# Patient Record
Sex: FEMALE | Race: BLACK OR AFRICAN AMERICAN | NOT HISPANIC OR LATINO | URBAN - METROPOLITAN AREA
[De-identification: names, ages, dates, MRNs, and addresses within clinical notes are randomized per-mention and may not be internally consistent; named-entity substitution may affect disease eponyms.]

---

## 2017-03-20 ENCOUNTER — INPATIENT (INPATIENT)
Facility: HOSPITAL | Age: 57
LOS: 0 days | Discharge: HOME | End: 2017-03-21
Attending: INTERNAL MEDICINE | Admitting: INTERNAL MEDICINE
Payer: COMMERCIAL

## 2017-03-20 VITALS
OXYGEN SATURATION: 100 % | DIASTOLIC BLOOD PRESSURE: 93 MMHG | TEMPERATURE: 98 F | HEART RATE: 94 BPM | SYSTOLIC BLOOD PRESSURE: 146 MMHG | RESPIRATION RATE: 18 BRPM

## 2017-03-20 DIAGNOSIS — J45.909 UNSPECIFIED ASTHMA, UNCOMPLICATED: ICD-10-CM

## 2017-03-20 DIAGNOSIS — Z98.890 OTHER SPECIFIED POSTPROCEDURAL STATES: Chronic | ICD-10-CM

## 2017-03-20 DIAGNOSIS — R55 SYNCOPE AND COLLAPSE: ICD-10-CM

## 2017-03-20 DIAGNOSIS — N64.52 NIPPLE DISCHARGE: ICD-10-CM

## 2017-03-20 DIAGNOSIS — I10 ESSENTIAL (PRIMARY) HYPERTENSION: ICD-10-CM

## 2017-03-20 DIAGNOSIS — Z41.8 ENCOUNTER FOR OTHER PROCEDURES FOR PURPOSES OTHER THAN REMEDYING HEALTH STATE: ICD-10-CM

## 2017-03-20 LAB
ALBUMIN SERPL ELPH-MCNC: 4.2 G/DL — SIGNIFICANT CHANGE UP (ref 3.3–5)
ALP SERPL-CCNC: 60 U/L — SIGNIFICANT CHANGE UP (ref 40–120)
ALT FLD-CCNC: 16 U/L — SIGNIFICANT CHANGE UP (ref 4–33)
APPEARANCE UR: CLEAR — SIGNIFICANT CHANGE UP
APTT BLD: 34.5 SEC — SIGNIFICANT CHANGE UP (ref 27.5–37.4)
AST SERPL-CCNC: 20 U/L — SIGNIFICANT CHANGE UP (ref 4–32)
BACTERIA # UR AUTO: SIGNIFICANT CHANGE UP
BASE EXCESS BLDV CALC-SCNC: 7.2 MMOL/L — SIGNIFICANT CHANGE UP
BASOPHILS # BLD AUTO: 0.02 K/UL — SIGNIFICANT CHANGE UP (ref 0–0.2)
BASOPHILS NFR BLD AUTO: 0.3 % — SIGNIFICANT CHANGE UP (ref 0–2)
BILIRUB SERPL-MCNC: 0.3 MG/DL — SIGNIFICANT CHANGE UP (ref 0.2–1.2)
BILIRUB UR-MCNC: NEGATIVE — SIGNIFICANT CHANGE UP
BLOOD GAS VENOUS - CREATININE: 0.68 MG/DL — SIGNIFICANT CHANGE UP (ref 0.5–1.3)
BLOOD UR QL VISUAL: NEGATIVE — SIGNIFICANT CHANGE UP
BUN SERPL-MCNC: 10 MG/DL — SIGNIFICANT CHANGE UP (ref 7–23)
CALCIUM SERPL-MCNC: 9.6 MG/DL — SIGNIFICANT CHANGE UP (ref 8.4–10.5)
CHLORIDE BLDV-SCNC: 107 MMOL/L — SIGNIFICANT CHANGE UP (ref 96–108)
CHLORIDE SERPL-SCNC: 101 MMOL/L — SIGNIFICANT CHANGE UP (ref 98–107)
CK MB BLD-MCNC: 0.5 — SIGNIFICANT CHANGE UP (ref 0–2.5)
CK MB BLD-MCNC: 1.23 NG/ML — SIGNIFICANT CHANGE UP (ref 1–4.7)
CK MB BLD-MCNC: 1.33 NG/ML — SIGNIFICANT CHANGE UP (ref 1–4.7)
CK SERPL-CCNC: 166 U/L — SIGNIFICANT CHANGE UP (ref 25–170)
CK SERPL-CCNC: 272 U/L — HIGH (ref 25–170)
CO2 SERPL-SCNC: 27 MMOL/L — SIGNIFICANT CHANGE UP (ref 22–31)
COLOR SPEC: YELLOW — SIGNIFICANT CHANGE UP
CREAT SERPL-MCNC: 0.69 MG/DL — SIGNIFICANT CHANGE UP (ref 0.5–1.3)
D DIMER BLD IA.RAPID-MCNC: < 150 NG/ML — SIGNIFICANT CHANGE UP
EOSINOPHIL # BLD AUTO: 0.04 K/UL — SIGNIFICANT CHANGE UP (ref 0–0.5)
EOSINOPHIL NFR BLD AUTO: 0.6 % — SIGNIFICANT CHANGE UP (ref 0–6)
GAS PNL BLDV: 135 MMOL/L — LOW (ref 136–146)
GLUCOSE BLDV-MCNC: 91 — SIGNIFICANT CHANGE UP (ref 70–99)
GLUCOSE SERPL-MCNC: 88 MG/DL — SIGNIFICANT CHANGE UP (ref 70–99)
GLUCOSE UR-MCNC: NEGATIVE — SIGNIFICANT CHANGE UP
HCO3 BLDV-SCNC: 28 MMOL/L — HIGH (ref 20–27)
HCT VFR BLD CALC: 42.9 % — SIGNIFICANT CHANGE UP (ref 34.5–45)
HCT VFR BLDV CALC: 43.6 % — SIGNIFICANT CHANGE UP (ref 34.5–45)
HGB BLD-MCNC: 14 G/DL — SIGNIFICANT CHANGE UP (ref 11.5–15.5)
HGB BLDV-MCNC: 14.2 G/DL — SIGNIFICANT CHANGE UP (ref 11.5–15.5)
IMM GRANULOCYTES NFR BLD AUTO: 0 % — SIGNIFICANT CHANGE UP (ref 0–1.5)
INR BLD: 0.97 — SIGNIFICANT CHANGE UP (ref 0.88–1.17)
KETONES UR-MCNC: NEGATIVE — SIGNIFICANT CHANGE UP
LACTATE BLDV-MCNC: 1.2 MMOL/L — SIGNIFICANT CHANGE UP (ref 0.5–2)
LEUKOCYTE ESTERASE UR-ACNC: NEGATIVE — SIGNIFICANT CHANGE UP
LYMPHOCYTES # BLD AUTO: 2.88 K/UL — SIGNIFICANT CHANGE UP (ref 1–3.3)
LYMPHOCYTES # BLD AUTO: 44.9 % — HIGH (ref 13–44)
MCHC RBC-ENTMCNC: 27.7 PG — SIGNIFICANT CHANGE UP (ref 27–34)
MCHC RBC-ENTMCNC: 32.6 % — SIGNIFICANT CHANGE UP (ref 32–36)
MCV RBC AUTO: 84.8 FL — SIGNIFICANT CHANGE UP (ref 80–100)
MONOCYTES # BLD AUTO: 0.53 K/UL — SIGNIFICANT CHANGE UP (ref 0–0.9)
MONOCYTES NFR BLD AUTO: 8.3 % — SIGNIFICANT CHANGE UP (ref 2–14)
MUCOUS THREADS # UR AUTO: SIGNIFICANT CHANGE UP
NEUTROPHILS # BLD AUTO: 2.94 K/UL — SIGNIFICANT CHANGE UP (ref 1.8–7.4)
NEUTROPHILS NFR BLD AUTO: 45.9 % — SIGNIFICANT CHANGE UP (ref 43–77)
NITRITE UR-MCNC: NEGATIVE — SIGNIFICANT CHANGE UP
NON-SQ EPI CELLS # UR AUTO: <1 — SIGNIFICANT CHANGE UP
PCO2 BLDV: 58 MMHG — HIGH (ref 41–51)
PH BLDV: 7.36 PH — SIGNIFICANT CHANGE UP (ref 7.32–7.43)
PH UR: 7 — SIGNIFICANT CHANGE UP (ref 4.6–8)
PLATELET # BLD AUTO: 247 K/UL — SIGNIFICANT CHANGE UP (ref 150–400)
PMV BLD: 10.8 FL — SIGNIFICANT CHANGE UP (ref 7–13)
PO2 BLDV: 26 MMHG — LOW (ref 35–40)
POTASSIUM BLDV-SCNC: 3.6 MMOL/L — SIGNIFICANT CHANGE UP (ref 3.4–4.5)
POTASSIUM SERPL-MCNC: 4.1 MMOL/L — SIGNIFICANT CHANGE UP (ref 3.5–5.3)
POTASSIUM SERPL-SCNC: 4.1 MMOL/L — SIGNIFICANT CHANGE UP (ref 3.5–5.3)
PROT SERPL-MCNC: 7.8 G/DL — SIGNIFICANT CHANGE UP (ref 6–8.3)
PROT UR-MCNC: NEGATIVE — SIGNIFICANT CHANGE UP
PROTHROM AB SERPL-ACNC: 10.9 SEC — SIGNIFICANT CHANGE UP (ref 9.8–13.1)
RBC # BLD: 5.06 M/UL — SIGNIFICANT CHANGE UP (ref 3.8–5.2)
RBC # FLD: 15.1 % — HIGH (ref 10.3–14.5)
RBC CASTS # UR COMP ASSIST: SIGNIFICANT CHANGE UP (ref 0–?)
SAO2 % BLDV: 39 % — LOW (ref 60–85)
SODIUM SERPL-SCNC: 142 MMOL/L — SIGNIFICANT CHANGE UP (ref 135–145)
SP GR SPEC: 1.02 — SIGNIFICANT CHANGE UP (ref 1–1.03)
SQUAMOUS # UR AUTO: SIGNIFICANT CHANGE UP
TROPONIN T SERPL-MCNC: < 0.06 NG/ML — SIGNIFICANT CHANGE UP (ref 0–0.06)
TROPONIN T SERPL-MCNC: < 0.06 NG/ML — SIGNIFICANT CHANGE UP (ref 0–0.06)
TSH SERPL-MCNC: 0.86 UIU/ML — SIGNIFICANT CHANGE UP (ref 0.27–4.2)
UROBILINOGEN FLD QL: NORMAL E.U. — SIGNIFICANT CHANGE UP (ref 0.1–0.2)
WBC # BLD: 6.41 K/UL — SIGNIFICANT CHANGE UP (ref 3.8–10.5)
WBC # FLD AUTO: 6.41 K/UL — SIGNIFICANT CHANGE UP (ref 3.8–10.5)
WBC UR QL: SIGNIFICANT CHANGE UP (ref 0–?)

## 2017-03-20 PROCEDURE — 71020: CPT | Mod: 26

## 2017-03-20 PROCEDURE — 70450 CT HEAD/BRAIN W/O DYE: CPT | Mod: 26

## 2017-03-20 RX ORDER — ASPIRIN/CALCIUM CARB/MAGNESIUM 324 MG
81 TABLET ORAL DAILY
Qty: 0 | Refills: 0 | Status: DISCONTINUED | OUTPATIENT
Start: 2017-03-20 | End: 2017-03-21

## 2017-03-20 RX ORDER — SODIUM CHLORIDE 9 MG/ML
1000 INJECTION INTRAMUSCULAR; INTRAVENOUS; SUBCUTANEOUS ONCE
Qty: 0 | Refills: 0 | Status: COMPLETED | OUTPATIENT
Start: 2017-03-20 | End: 2017-03-20

## 2017-03-20 RX ORDER — LOSARTAN POTASSIUM 100 MG/1
100 TABLET, FILM COATED ORAL DAILY
Qty: 0 | Refills: 0 | Status: DISCONTINUED | OUTPATIENT
Start: 2017-03-20 | End: 2017-03-21

## 2017-03-20 RX ORDER — ALBUTEROL 90 UG/1
2 AEROSOL, METERED ORAL EVERY 6 HOURS
Qty: 0 | Refills: 0 | Status: DISCONTINUED | OUTPATIENT
Start: 2017-03-20 | End: 2017-03-21

## 2017-03-20 RX ORDER — ENOXAPARIN SODIUM 100 MG/ML
40 INJECTION SUBCUTANEOUS EVERY 24 HOURS
Qty: 0 | Refills: 0 | Status: DISCONTINUED | OUTPATIENT
Start: 2017-03-20 | End: 2017-03-21

## 2017-03-20 RX ADMIN — Medication 81 MILLIGRAM(S): at 19:51

## 2017-03-20 RX ADMIN — SODIUM CHLORIDE 2000 MILLILITER(S): 9 INJECTION INTRAMUSCULAR; INTRAVENOUS; SUBCUTANEOUS at 17:44

## 2017-03-20 NOTE — H&P ADULT. - GASTROINTESTINAL DETAILS
no rebound tenderness/bowel sounds normal/normal/nontender/no bruit/no distention/no organomegaly/no guarding/no rigidity/no masses palpable/soft

## 2017-03-20 NOTE — H&P ADULT. - ASSESSMENT
56 y/o F PMHx HTN presents with cough x 1 year, dizziness and syncope x 1 wk worsening since yesterday, right sided chest pain radiating to arm, headache, and bilateral nipple discharge. 58 yo female with PMHx of HTN and Asthma p/w dizziness, lightheadeadness after coughing, followed by passing out x 2 episodes today, admitted to tele for syncope, r/o ACS and Hypovolemia\Dehydration.    +Syncope-->Abdullahi x2, orthostatics, TTE, UA  +Hypovolemia/Dehydration-->IV Fluids

## 2017-03-20 NOTE — H&P ADULT. - NEUROLOGICAL DETAILS
alert and oriented x 3/normal strength/sensation intact/no spontaneous movement/responds to pain/responds to verbal commands

## 2017-03-20 NOTE — H&P ADULT. - HISTORY OF PRESENT ILLNESS
56 y/o F PMHx HTN and asthma presents with a cough x 1 year. Pt has never sought treatment for this cough because she thought it was her asthma or the weather. She had previous been prescribed an inhaler, but does not use it because it causes nausea. The cough is sometimes productive, and when it is she brings up white sputum. Sometimes she coughs after eating food, but not all of the time. She also reports that she needs to drink water and eat slowly because she feel as if she may choke. Pt notes bifrontal, pulsatile headache and dizziness after coughing since last week intermittently, but has increased in frequency since yesterday. Over the past week she has "blacked out" 3-4 times after these coughing and dizzy spells, and this morning it occurred twice while she was at work. Nothing alleviates this, although when it occurs she makes sure to sit down and rest before she falls. Pt reports feeling unbalanced while walking aside from the dizziness, and right sided chest pain radiating to the right upper extremity. Pt also reports that yesterday she noticed discharge coming from both of her nipples while doing a self-breast exam, last mammogram was last week. Pt denies falling, fever, chills, nausea, vomiting, vision changes, sore throat, jaw pain, abdominal pain, urinary and bowel changes. 58 y/o F PMHx HTN and asthma presents with passing out episodes today. Pt reports to chronic intermittent cough x 1 year, non-productive in nature, accompanied with dizziness and lightheadedness for past 2 weeks to extent where today she felt like passing out. Pt sat down on her chair and passed out for about 5-10 secs unwitnessed. Pt had 2 of these episodes today and decided to come to the hospital Pt has never sought treatment for this cough because she thought it was her asthma or the weather. She had previous been prescribed an inhaler, but does not use it because it causes nausea. The cough is sometimes productive, and when it is she brings up white sputum. Sometimes she coughs after eating food, but not all of the time. She also reports that she needs to drink water and eat slowly because she feel as if she may choke. Pt notes bifrontal, pulsatile headache and dizziness after coughing since last week intermittently, but has increased in frequency since yesterday. Over the past week she has "blacked out" 3-4 times after these coughing and dizzy spells, and this morning it occurred twice while she was at work. Nothing alleviates this, although when it occurs she makes sure to sit down and rest before she falls. Pt reports feeling unbalanced while walking aside from the dizziness, and right sided chest pain radiating to the right upper extremity. Pt also reports that yesterday she noticed discharge coming from both of her nipples while doing a self-breast exam, last mammogram was last week. Pt denies falling, fever, chills, nausea, vomiting, vision changes, sore throat, jaw pain, abdominal pain, urinary and bowel changes. 56 y/o F PMHx HTN and asthma presents with passing out episodes today. Pt reports to chronic intermittent cough x 1 year, non-productive in nature, accompanied with dizziness and lightheadedness for past 2 weeks to extent where today she felt like passing out. Pt sat down on her chair and passed out for about 5-10 secs unwitnessed. Pt had 2 of these episodes today and decided to come to the hospital Pt recently established PCP who she visited 2 weeks ago. She never sought treatment for her cough because she thought it was her mild asthma. She had previous been prescribed an inhaler, but does not use it because it causes nausea. Pt admits to bifrontal, pulsatile headache and dizziness after coughing since last week intermittently, but has increased in frequency since yesterday. Nothing alleviates this, although when it occurs she makes sure to sit down and rest before she falls. Pt reports feeling unbalanced while walking aside from the dizziness, and right sided chest pain radiating to the right upper extremity. Chest pain described as right sided discomfort radiating to right arm. Pt also reports that yesterday she noticed discharge coming from both of her nipples while doing a self-breast exam, last mammogram was last week with no results yet. Pt denies falling, fever, chills, nausea, vomiting, vision changes, sore throat, jaw pain, abdominal pain, urinary and bowel changes.

## 2017-03-20 NOTE — ED PROVIDER NOTE - OBJECTIVE STATEMENT
58yo female with pmh of HTN presents with cough and syncope. Pt states for the past week she's been coughing a lot and today she "passed out" 2 times after coughing and while in the waiting room today also syncopized. Pt states that she feels like she is going to pass out and is able to sit down, passes out for about 20s and then takes her like a minute to feel back to normal. Pt denies falls, head trauma. Pt also reports right sided arm, chest and neck pain going up to her head. Pt also with sob at times. Pt reports right nipple discharge yesterday, states she had a mammogram done last week which she was called for yesterday stating she needed a follow up on in April. Pt denies fevers/chills, abd pain/n/v/d, urinary symptoms, palp, recent travel and sickness.

## 2017-03-20 NOTE — ED PROVIDER NOTE - MEDICAL DECISION MAKING DETAILS
58yo female with pmh of HTN presents with cough and syncope r/o arrhythmia and pna - labs, ekg, cxr, syncope work up

## 2017-03-20 NOTE — H&P ADULT. - PSYCHIATRIC
negative not examined Affect and characteristics of appearance, verbalizations, behaviors are appropriate

## 2017-03-20 NOTE — H&P ADULT. - PROBLEM SELECTOR PLAN 1
tele monitor   cardiac enzymes x 2 to r/o ACS  Orthostatic blood pressures  DASH diet  Ecotrin 81mg po daily  FLP, TSH, UA  TTE  High suspicion for hypovolemia  IV Fluid hydration  Hold HCTZ for now

## 2017-03-20 NOTE — H&P ADULT. - FAMILY HISTORY
Father  Still living? Unknown  Family history of stomach cancer, Age at diagnosis: Age Unknown  Family history of esophageal cancer, Age at diagnosis: Age Unknown     Child  Still living? Unknown  Family history of retinoblastoma, Age at diagnosis: Age Unknown     Grandparent  Still living? Unknown  Family history of ovarian cancer, Age at diagnosis: Age Unknown

## 2017-03-20 NOTE — H&P ADULT. - BREASTS COMMENTS
Minimal discharge, left nipple discharge is brown, right nipple discharge is white. Minimal discharge, left nipple discharge is white, right nipple discharge is brown. Minimal discharge, left nipple discharge is white, right nipple discharge is brown. Small nodular-like bump felt at 6 o'clock proximal to areola. No erythema

## 2017-03-20 NOTE — ED ADULT NURSE NOTE - OBJECTIVE STATEMENT
c/o dizziness, presyncope, right breast drainage. iv start 20g right ac. blood drawn. pt alert and oriented x 3.

## 2017-03-20 NOTE — ED PROVIDER NOTE - ATTENDING CONTRIBUTION TO CARE
DR Bloch- Patient c/o episodes of near/syncope/syncope, sometimes @ cough, recent URI, right sidedCP, breast soreness with some d/c, No fever, chills. HEENT nml, Lungs ocasional wheeze, throat nml, heart sounds s1s2, abd soft nontender, ext no edema-

## 2017-03-20 NOTE — H&P ADULT. - PROBLEM SELECTOR PLAN 4
pt s/p mammogram done 2 weeks ago. Pt scheduled to follow with her PCP to discuss mammogram results this week

## 2017-03-20 NOTE — H&P ADULT. - NEGATIVE GASTROINTESTINAL SYMPTOMS
no hematochezia/no nausea/no change in bowel habits/no abdominal pain/no diarrhea/no constipation/no melena/no vomiting

## 2017-03-20 NOTE — ED ADULT TRIAGE NOTE - CHIEF COMPLAINT QUOTE
Pt states she's been coughing for "a while" and for the past week has sharp pain in her right side of neck and head when she coughs, accompanied by dizziness and lightheadedness, reports syncope this morning. Pt also c/o pain to right chest, right breast, and noticed secretions from her right nipple this morning. PMH of HTN. Pt started Olmesartan Hctz 1 week ago

## 2017-03-21 VITALS
DIASTOLIC BLOOD PRESSURE: 70 MMHG | RESPIRATION RATE: 20 BRPM | HEART RATE: 91 BPM | SYSTOLIC BLOOD PRESSURE: 122 MMHG | OXYGEN SATURATION: 100 % | TEMPERATURE: 98 F

## 2017-03-21 LAB
BUN SERPL-MCNC: 8 MG/DL — SIGNIFICANT CHANGE UP (ref 7–23)
CALCIUM SERPL-MCNC: 9.2 MG/DL — SIGNIFICANT CHANGE UP (ref 8.4–10.5)
CHLORIDE SERPL-SCNC: 102 MMOL/L — SIGNIFICANT CHANGE UP (ref 98–107)
CO2 SERPL-SCNC: 25 MMOL/L — SIGNIFICANT CHANGE UP (ref 22–31)
CREAT SERPL-MCNC: 0.62 MG/DL — SIGNIFICANT CHANGE UP (ref 0.5–1.3)
GLUCOSE SERPL-MCNC: 107 MG/DL — HIGH (ref 70–99)
HCT VFR BLD CALC: 41.7 % — SIGNIFICANT CHANGE UP (ref 34.5–45)
HGB BLD-MCNC: 13.8 G/DL — SIGNIFICANT CHANGE UP (ref 11.5–15.5)
MCHC RBC-ENTMCNC: 28 PG — SIGNIFICANT CHANGE UP (ref 27–34)
MCHC RBC-ENTMCNC: 33.1 % — SIGNIFICANT CHANGE UP (ref 32–36)
MCV RBC AUTO: 84.6 FL — SIGNIFICANT CHANGE UP (ref 80–100)
PLATELET # BLD AUTO: 244 K/UL — SIGNIFICANT CHANGE UP (ref 150–400)
PMV BLD: 10.5 FL — SIGNIFICANT CHANGE UP (ref 7–13)
POTASSIUM SERPL-MCNC: 3.8 MMOL/L — SIGNIFICANT CHANGE UP (ref 3.5–5.3)
POTASSIUM SERPL-SCNC: 3.8 MMOL/L — SIGNIFICANT CHANGE UP (ref 3.5–5.3)
RBC # BLD: 4.93 M/UL — SIGNIFICANT CHANGE UP (ref 3.8–5.2)
RBC # FLD: 15 % — HIGH (ref 10.3–14.5)
SODIUM SERPL-SCNC: 142 MMOL/L — SIGNIFICANT CHANGE UP (ref 135–145)
WBC # BLD: 6.28 K/UL — SIGNIFICANT CHANGE UP (ref 3.8–10.5)
WBC # FLD AUTO: 6.28 K/UL — SIGNIFICANT CHANGE UP (ref 3.8–10.5)

## 2017-03-21 PROCEDURE — 93306 TTE W/DOPPLER COMPLETE: CPT | Mod: 26

## 2017-03-21 RX ORDER — ONDANSETRON 8 MG/1
4 TABLET, FILM COATED ORAL ONCE
Qty: 0 | Refills: 0 | Status: COMPLETED | OUTPATIENT
Start: 2017-03-21 | End: 2017-03-21

## 2017-03-21 RX ORDER — MECLIZINE HCL 12.5 MG
1 TABLET ORAL
Qty: 28 | Refills: 0 | OUTPATIENT
Start: 2017-03-21 | End: 2017-04-04

## 2017-03-21 RX ADMIN — ONDANSETRON 4 MILLIGRAM(S): 8 TABLET, FILM COATED ORAL at 02:27

## 2017-03-21 RX ADMIN — Medication 81 MILLIGRAM(S): at 13:00

## 2017-03-21 RX ADMIN — LOSARTAN POTASSIUM 100 MILLIGRAM(S): 100 TABLET, FILM COATED ORAL at 07:36

## 2017-03-21 NOTE — DISCHARGE NOTE ADULT - HOSPITAL COURSE
56 y/o female with a PMHx of HTN and asthma presents to ED with dizziness and lightheadeadness after coughing, followed by two syncopal episodes. MI ruled out. Echo shows normal Lv function.  Syncope due to dehydration. Patient stable for discharge home as per Dr Dupont.       + Hypovolemia/dehydration- S/P 1L NS, improved        EKG: NSR at 89 bpm  CE x2: Negative  CXR: Clear lungs. No pleural effusions or pneumothorax. Cardiac and mediastinal silhouettes within normal limits. Trachea midline. Unremarkable osseous structures.  CT head: No CT evidence of acute territorial infarct, intracranial hemorrhage or mass effect. No displaced calvarial fracture.  Orthostatics: Negative   UA: Negative       PMH  (Admit Diagnosis) Syncope and collapse  (PMH) Asthma  (PMH) HTN (hypertension)  (Principal DC/DX) Syncope  (Problem/DX) Need for prophylactic measure  (Problem/DX) Nipple discharge in female  (Problem/DX) Asthma  (Problem/DX) HTN (hypertension)  (Problem/DX) Syncope  (PSH) H/O myomectomy

## 2017-03-21 NOTE — DISCHARGE NOTE ADULT - MEDICATION SUMMARY - MEDICATIONS TO TAKE
I will START or STAY ON the medications listed below when I get home from the hospital:    olmesartan-hydroCHLOROthiazide 40mg-25mg oral tablet  -- 1 tab(s) by mouth once a day  -- Indication: For HTN (hypertension)    ProAir HFA 90 mcg/inh inhalation aerosol  -- 2 puff(s) inhaled 4 times a day, As Needed  -- Indication: For Asthma I will START or STAY ON the medications listed below when I get home from the hospital:    Antivert 12.5 mg oral tablet  -- 1 tab(s) by mouth 2 times a day, As Needed -for dizziness  -- May cause drowsiness.  Alcohol may intensify this effect.  Use care when operating dangerous machinery.    -- Indication: For vertigo    olmesartan-hydroCHLOROthiazide 40mg-25mg oral tablet  -- 1 tab(s) by mouth once a day  -- Indication: For HTN (hypertension)    ProAir HFA 90 mcg/inh inhalation aerosol  -- 2 puff(s) inhaled 4 times a day, As Needed  -- Indication: For Asthma

## 2017-03-21 NOTE — DISCHARGE NOTE ADULT - CARE PROVIDER_API CALL
Darline Dupont (DO), Cardiology; Internal Medicine  [address]  [place] [address]  Phone: [phone]  Fax: [phone]

## 2017-03-21 NOTE — DISCHARGE NOTE ADULT - CARE PLAN
Principal Discharge DX:	Syncope  Goal:	prevent recurring syncope  Instructions for follow-up, activity and diet:	Follow up with Dr dorsey in one week. may need stress testing and holter  low salt, low fat, low cholesterol  Secondary Diagnosis:	HTN (hypertension)  Instructions for follow-up, activity and diet:	olmesartan-HCTZ  low salt, low fat, low cholesterol  Secondary Diagnosis:	Asthma  Instructions for follow-up, activity and diet:	proair Principal Discharge DX:	Syncope  Goal:	prevent recurring syncope  Instructions for follow-up, activity and diet:	Follow up with Dr dorsey in one week. May need stress testing and holter  low salt, low fat, low cholesterol  Follow up with your primary care doctor- may need referral to a neurologist as outpatient  Secondary Diagnosis:	HTN (hypertension)  Instructions for follow-up, activity and diet:	olmesartan-HCTZ  low salt, low fat, low cholesterol  Secondary Diagnosis:	Asthma  Instructions for follow-up, activity and diet:	proair

## 2017-03-21 NOTE — DISCHARGE NOTE ADULT - PLAN OF CARE
Follow up with Dr dorsey in one week. may need stress testing and holter  low salt, low fat, low cholesterol prevent recurring syncope olmesartan-HCTZ  low salt, low fat, low cholesterol proair Follow up with Dr dorsey in one week. May need stress testing and holter  low salt, low fat, low cholesterol  Follow up with your primary care doctor- may need referral to a neurologist as outpatient

## 2017-03-21 NOTE — DISCHARGE NOTE ADULT - PATIENT PORTAL LINK FT
“You can access the Follow[employer] Patient Portal, offered by [facility], by registering with the following website: [email or web address]”

## 2017-05-18 ENCOUNTER — OUTPATIENT (OUTPATIENT)
Dept: OUTPATIENT SERVICES | Facility: HOSPITAL | Age: 57
LOS: 1 days | Discharge: HOME | End: 2017-05-18
Payer: COMMERCIAL

## 2017-05-18 DIAGNOSIS — Z98.890 OTHER SPECIFIED POSTPROCEDURAL STATES: Chronic | ICD-10-CM

## 2017-05-18 DIAGNOSIS — R94.39 ABNORMAL RESULT OF OTHER CARDIOVASCULAR FUNCTION STUDY: ICD-10-CM

## 2017-05-18 LAB
BUN SERPL-MCNC: 7 MG/DL — SIGNIFICANT CHANGE UP (ref 7–23)
CALCIUM SERPL-MCNC: 9.5 MG/DL — SIGNIFICANT CHANGE UP (ref 8.4–10.5)
CHLORIDE SERPL-SCNC: 100 MMOL/L — SIGNIFICANT CHANGE UP (ref 98–107)
CO2 SERPL-SCNC: 26 MMOL/L — SIGNIFICANT CHANGE UP (ref 22–31)
CREAT SERPL-MCNC: 0.72 MG/DL — SIGNIFICANT CHANGE UP (ref 0.5–1.3)
GLUCOSE SERPL-MCNC: 96 MG/DL — SIGNIFICANT CHANGE UP (ref 70–99)
HBA1C BLD-MCNC: 6.6 % — HIGH (ref 4–5.6)
HCT VFR BLD CALC: 41 % — SIGNIFICANT CHANGE UP (ref 34.5–45)
HGB BLD-MCNC: 13.3 G/DL — SIGNIFICANT CHANGE UP (ref 11.5–15.5)
MCHC RBC-ENTMCNC: 27.5 PG — SIGNIFICANT CHANGE UP (ref 27–34)
MCHC RBC-ENTMCNC: 32.4 % — SIGNIFICANT CHANGE UP (ref 32–36)
MCV RBC AUTO: 84.9 FL — SIGNIFICANT CHANGE UP (ref 80–100)
PLATELET # BLD AUTO: 292 K/UL — SIGNIFICANT CHANGE UP (ref 150–400)
PMV BLD: 10.1 FL — SIGNIFICANT CHANGE UP (ref 7–13)
POTASSIUM SERPL-MCNC: 3.6 MMOL/L — SIGNIFICANT CHANGE UP (ref 3.5–5.3)
POTASSIUM SERPL-SCNC: 3.6 MMOL/L — SIGNIFICANT CHANGE UP (ref 3.5–5.3)
RBC # BLD: 4.83 M/UL — SIGNIFICANT CHANGE UP (ref 3.8–5.2)
RBC # FLD: 14.8 % — HIGH (ref 10.3–14.5)
SODIUM SERPL-SCNC: 140 MMOL/L — SIGNIFICANT CHANGE UP (ref 135–145)
WBC # BLD: 6.52 K/UL — SIGNIFICANT CHANGE UP (ref 3.8–10.5)
WBC # FLD AUTO: 6.52 K/UL — SIGNIFICANT CHANGE UP (ref 3.8–10.5)

## 2017-05-18 PROCEDURE — 93010 ELECTROCARDIOGRAM REPORT: CPT

## 2017-05-18 PROCEDURE — 93458 L HRT ARTERY/VENTRICLE ANGIO: CPT | Mod: 26

## 2017-05-18 RX ORDER — SODIUM CHLORIDE 9 MG/ML
3 INJECTION INTRAMUSCULAR; INTRAVENOUS; SUBCUTANEOUS EVERY 8 HOURS
Qty: 0 | Refills: 0 | Status: DISCONTINUED | OUTPATIENT
Start: 2017-05-18 | End: 2017-06-02

## 2017-05-18 RX ORDER — SODIUM CHLORIDE 9 MG/ML
500 INJECTION INTRAMUSCULAR; INTRAVENOUS; SUBCUTANEOUS
Qty: 0 | Refills: 0 | Status: DISCONTINUED | OUTPATIENT
Start: 2017-05-18 | End: 2017-06-02

## 2017-05-18 NOTE — H&P CARDIOLOGY - NEGATIVE NEUROLOGICAL SYMPTOMS
no tremors/no focal seizures/no loss of sensation/no transient paralysis/no weakness/no confusion/no paresthesias/no difficulty walking/no hemiparesis/no generalized seizures/no headache/no facial palsy

## 2017-05-18 NOTE — H&P CARDIOLOGY - HISTORY OF PRESENT ILLNESS
58 y/o F PMHx HTN and asthma presents for cardiac catheretization. 58 y/o F PMHx HTN, DM, HLD and asthma presents for cardiac catheretization. Pt had a recent positive stress test which showed inferior wall ischemia. 56 y/o F PMHx HTN, DM, HLD and asthma presents for cardiac catheretization. Pt had a recent positive stress test which showed inferior wall ischemia as per Dr. Dupont. Pt states that she has been experiencing left sided chest discomfort at rest and worsening dyspnea on exertion. Pt also admits to dizziness and lightheadedness with recent admission for syncope with a normal CT head and echocardiogram. Pt denies N/V/D, fevers, chills, cough, dyspnea on exertion, orthopnea, nocturnal paroxysmal dyspnea, edema, cyanosis, heart murmurs, varicosities, phlebitis, claudication.

## 2017-05-18 NOTE — H&P CARDIOLOGY - RS GEN PE MLT RESP DETAILS PC
good air movement/airway patent/breath sounds equal/no chest wall tenderness/clear to auscultation bilaterally/respirations non-labored

## 2017-05-18 NOTE — H&P CARDIOLOGY - PMH
Asthma    HTN (hypertension) Asthma    DM (diabetes mellitus)    HLD (hyperlipidemia)    HTN (hypertension)

## 2017-07-20 ENCOUNTER — APPOINTMENT (OUTPATIENT)
Dept: SURGICAL ONCOLOGY | Facility: CLINIC | Age: 57
End: 2017-07-20
Payer: COMMERCIAL

## 2017-07-20 VITALS
HEIGHT: 69 IN | SYSTOLIC BLOOD PRESSURE: 132 MMHG | OXYGEN SATURATION: 97 % | BODY MASS INDEX: 34.21 KG/M2 | DIASTOLIC BLOOD PRESSURE: 84 MMHG | WEIGHT: 231 LBS | HEART RATE: 89 BPM

## 2017-07-20 DIAGNOSIS — Z86.39 PERSONAL HISTORY OF OTHER ENDOCRINE, NUTRITIONAL AND METABOLIC DISEASE: ICD-10-CM

## 2017-07-20 DIAGNOSIS — Z86.79 PERSONAL HISTORY OF OTHER DISEASES OF THE CIRCULATORY SYSTEM: ICD-10-CM

## 2017-07-20 PROCEDURE — 99204 OFFICE O/P NEW MOD 45 MIN: CPT

## 2017-07-20 RX ORDER — ALBUTEROL SULFATE 90 UG/1
AEROSOL, METERED RESPIRATORY (INHALATION) EVERY 6 HOURS
Refills: 0 | Status: ACTIVE | COMMUNITY

## 2017-07-20 RX ORDER — OLMESARTAN MEDOXOMIL-HYDROCHLOROTHIAZIDE 25; 40 MG/1; MG/1
40-25 TABLET, FILM COATED ORAL DAILY
Refills: 0 | Status: ACTIVE | COMMUNITY

## 2017-07-24 ENCOUNTER — OUTPATIENT (OUTPATIENT)
Dept: OUTPATIENT SERVICES | Facility: HOSPITAL | Age: 57
LOS: 1 days | End: 2017-07-24

## 2017-07-24 DIAGNOSIS — Z98.890 OTHER SPECIFIED POSTPROCEDURAL STATES: Chronic | ICD-10-CM

## 2017-07-24 DIAGNOSIS — Z00.8 ENCOUNTER FOR OTHER GENERAL EXAMINATION: ICD-10-CM

## 2017-07-26 ENCOUNTER — APPOINTMENT (OUTPATIENT)
Dept: PLASTIC SURGERY | Facility: CLINIC | Age: 57
End: 2017-07-26

## 2017-07-26 ENCOUNTER — OUTPATIENT (OUTPATIENT)
Dept: OUTPATIENT SERVICES | Facility: HOSPITAL | Age: 57
LOS: 1 days | End: 2017-07-26
Payer: SELF-PAY

## 2017-07-26 DIAGNOSIS — Z98.890 OTHER SPECIFIED POSTPROCEDURAL STATES: Chronic | ICD-10-CM

## 2017-07-26 DIAGNOSIS — D05.11 INTRADUCTAL CARCINOMA IN SITU OF RIGHT BREAST: ICD-10-CM

## 2017-07-27 ENCOUNTER — RESULT REVIEW (OUTPATIENT)
Age: 57
End: 2017-07-27

## 2017-07-27 LAB — SURGICAL PATHOLOGY STUDY: SIGNIFICANT CHANGE UP

## 2017-07-27 PROCEDURE — 88321 CONSLTJ&REPRT SLD PREP ELSWR: CPT

## 2017-08-03 ENCOUNTER — OUTPATIENT (OUTPATIENT)
Dept: OUTPATIENT SERVICES | Facility: HOSPITAL | Age: 57
LOS: 1 days | End: 2017-08-03

## 2017-08-03 VITALS
HEIGHT: 68 IN | HEART RATE: 84 BPM | TEMPERATURE: 98 F | OXYGEN SATURATION: 100 % | SYSTOLIC BLOOD PRESSURE: 130 MMHG | WEIGHT: 238.1 LBS | DIASTOLIC BLOOD PRESSURE: 80 MMHG | RESPIRATION RATE: 16 BRPM

## 2017-08-03 DIAGNOSIS — J45.909 UNSPECIFIED ASTHMA, UNCOMPLICATED: ICD-10-CM

## 2017-08-03 DIAGNOSIS — R06.02 SHORTNESS OF BREATH: ICD-10-CM

## 2017-08-03 DIAGNOSIS — D05.11 INTRADUCTAL CARCINOMA IN SITU OF RIGHT BREAST: ICD-10-CM

## 2017-08-03 DIAGNOSIS — I10 ESSENTIAL (PRIMARY) HYPERTENSION: ICD-10-CM

## 2017-08-03 DIAGNOSIS — Z98.890 OTHER SPECIFIED POSTPROCEDURAL STATES: Chronic | ICD-10-CM

## 2017-08-03 DIAGNOSIS — E11.9 TYPE 2 DIABETES MELLITUS WITHOUT COMPLICATIONS: ICD-10-CM

## 2017-08-03 DIAGNOSIS — R06.83 SNORING: ICD-10-CM

## 2017-08-03 LAB
BLD GP AB SCN SERPL QL: NEGATIVE — SIGNIFICANT CHANGE UP
BUN SERPL-MCNC: 13 MG/DL — SIGNIFICANT CHANGE UP (ref 7–23)
CALCIUM SERPL-MCNC: 9.7 MG/DL — SIGNIFICANT CHANGE UP (ref 8.4–10.5)
CHLORIDE SERPL-SCNC: 103 MMOL/L — SIGNIFICANT CHANGE UP (ref 98–107)
CO2 SERPL-SCNC: 24 MMOL/L — SIGNIFICANT CHANGE UP (ref 22–31)
CREAT SERPL-MCNC: 0.69 MG/DL — SIGNIFICANT CHANGE UP (ref 0.5–1.3)
GLUCOSE SERPL-MCNC: 91 MG/DL — SIGNIFICANT CHANGE UP (ref 70–99)
HBA1C BLD-MCNC: 6.6 % — HIGH (ref 4–5.6)
HCT VFR BLD CALC: 42.5 % — SIGNIFICANT CHANGE UP (ref 34.5–45)
HGB BLD-MCNC: 13.9 G/DL — SIGNIFICANT CHANGE UP (ref 11.5–15.5)
MCHC RBC-ENTMCNC: 27.4 PG — SIGNIFICANT CHANGE UP (ref 27–34)
MCHC RBC-ENTMCNC: 32.7 % — SIGNIFICANT CHANGE UP (ref 32–36)
MCV RBC AUTO: 83.8 FL — SIGNIFICANT CHANGE UP (ref 80–100)
NRBC # FLD: 0 — SIGNIFICANT CHANGE UP
PLATELET # BLD AUTO: 280 K/UL — SIGNIFICANT CHANGE UP (ref 150–400)
PMV BLD: 11.2 FL — SIGNIFICANT CHANGE UP (ref 7–13)
POTASSIUM SERPL-MCNC: 3.8 MMOL/L — SIGNIFICANT CHANGE UP (ref 3.5–5.3)
POTASSIUM SERPL-SCNC: 3.8 MMOL/L — SIGNIFICANT CHANGE UP (ref 3.5–5.3)
RBC # BLD: 5.07 M/UL — SIGNIFICANT CHANGE UP (ref 3.8–5.2)
RBC # FLD: 14.6 % — HIGH (ref 10.3–14.5)
RH IG SCN BLD-IMP: POSITIVE — SIGNIFICANT CHANGE UP
SODIUM SERPL-SCNC: 141 MMOL/L — SIGNIFICANT CHANGE UP (ref 135–145)
WBC # BLD: 8.09 K/UL — SIGNIFICANT CHANGE UP (ref 3.8–10.5)
WBC # FLD AUTO: 8.09 K/UL — SIGNIFICANT CHANGE UP (ref 3.8–10.5)

## 2017-08-03 RX ORDER — SODIUM CHLORIDE 9 MG/ML
3 INJECTION INTRAMUSCULAR; INTRAVENOUS; SUBCUTANEOUS EVERY 8 HOURS
Qty: 0 | Refills: 0 | Status: DISCONTINUED | OUTPATIENT
Start: 2017-08-15 | End: 2017-08-17

## 2017-08-03 RX ORDER — ASPIRIN/CALCIUM CARB/MAGNESIUM 324 MG
1 TABLET ORAL
Qty: 0 | Refills: 0 | COMMUNITY

## 2017-08-03 NOTE — H&P PST ADULT - PROBLEM SELECTOR PLAN 6
Pt unsure of sleep study results. Pt with  >3 criteria on STOP-Bang [place]air[place]. OR booking notified of BHARATHI precautions. Requested copy of sleep study.

## 2017-08-03 NOTE — H&P PST ADULT - NEGATIVE MUSCULOSKELETAL SYMPTOMS
no muscle weakness/no muscle cramps/no neck pain/no myalgia/no back pain/no joint swelling/no arthritis

## 2017-08-03 NOTE — H&P PST ADULT - FAMILY HISTORY
Father  Still living? Unknown  Family history of stomach cancer, Age at diagnosis: Age Unknown  Family history of esophageal cancer, Age at diagnosis: Age Unknown     Child  Still living? Unknown  Family history of retinoblastoma, Age at diagnosis: Age Unknown

## 2017-08-03 NOTE — H&P PST ADULT - PMH
Asthma    DM (diabetes mellitus)    HLD (hyperlipidemia)  no meds  HTN (hypertension) Asthma    DM (diabetes mellitus)    HLD (hyperlipidemia)  no meds  HTN (hypertension)    Obesity

## 2017-08-03 NOTE — H&P PST ADULT - RESPIRATORY AND THORAX COMMENTS
chronic cough for the past 3 years, saw pulmonologist who thinks it may be asthma related - taking ventolin daily, states she has passed out in the past when coughing (last episode a few months ago), was referred to cardiologist who did stress test which was abnormal, then had angiogram which was normal

## 2017-08-03 NOTE — H&P PST ADULT - NEGATIVE BREAST SYMPTOMS
no breast tenderness L/no breast lump L/no breast lump R/no nipple discharge L/no breast tenderness R

## 2017-08-03 NOTE — H&P PST ADULT - MUSCULOSKELETAL
details… detailed exam no calf tenderness/no joint warmth/no joint swelling/normal strength/ROM intact/no joint erythema

## 2017-08-03 NOTE — H&P PST ADULT - NSANTHOSAYNRD_GEN_A_CORE
No. BHARATHI screening performed.  STOP BANG Legend: 0-2 = LOW Risk; 3-4 = INTERMEDIATE Risk; 5-8 = HIGH Risk/tested 3 months ago - pt unsure of test results

## 2017-08-03 NOTE — H&P PST ADULT - PROBLEM SELECTOR PLAN 1
Right Mastectomy, Left Prophylactic Mastectomy, Bilateral Axillary [place] Lymph Node Biopsies (Dr. Ramachandran to add codes) scheduled on 8/15/17.  Pre-op instructions provided. Pt verbalized understanding.   Pepcid provided for GI prophylaxis.   Chlorhexidine wash provided and instructions given.

## 2017-08-03 NOTE — H&P PST ADULT - HISTORY OF PRESENT ILLNESS
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 Pt presents today for presurgical evaluation for... 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 Pt presents today for presurgical evaluation for Right Mastectomy, Left Prophylactic Mastectomy, Bilateral Axillary [place] Lymph Node Biopsies (Dr. Ramachandran to add codes) scheduled on 8/15/17.

## 2017-08-04 ENCOUNTER — APPOINTMENT (OUTPATIENT)
Dept: OPHTHALMOLOGY | Facility: CLINIC | Age: 57
End: 2017-08-04
Payer: COMMERCIAL

## 2017-08-04 DIAGNOSIS — H40.033 ANATOMICAL NARROW ANGLE, BILATERAL: ICD-10-CM

## 2017-08-04 DIAGNOSIS — H04.123 DRY EYE SYNDROME OF BILATERAL LACRIMAL GLANDS: ICD-10-CM

## 2017-08-04 PROCEDURE — 92133 CPTRZD OPH DX IMG PST SGM ON: CPT

## 2017-08-04 PROCEDURE — 92004 COMPRE OPH EXAM NEW PT 1/>: CPT

## 2017-08-04 PROCEDURE — 76514 ECHO EXAM OF EYE THICKNESS: CPT

## 2017-08-15 ENCOUNTER — INPATIENT (INPATIENT)
Facility: HOSPITAL | Age: 57
LOS: 1 days | Discharge: HOME HEALTH | End: 2017-08-17
Attending: SURGERY | Admitting: SURGERY
Payer: COMMERCIAL

## 2017-08-15 ENCOUNTER — APPOINTMENT (OUTPATIENT)
Dept: NUCLEAR MEDICINE | Facility: HOSPITAL | Age: 57
End: 2017-08-15

## 2017-08-15 ENCOUNTER — RESULT REVIEW (OUTPATIENT)
Age: 57
End: 2017-08-15

## 2017-08-15 ENCOUNTER — APPOINTMENT (OUTPATIENT)
Dept: SURGICAL ONCOLOGY | Facility: HOSPITAL | Age: 57
End: 2017-08-15

## 2017-08-15 VITALS
HEIGHT: 68 IN | RESPIRATION RATE: 16 BRPM | DIASTOLIC BLOOD PRESSURE: 84 MMHG | SYSTOLIC BLOOD PRESSURE: 137 MMHG | TEMPERATURE: 98 F | OXYGEN SATURATION: 100 % | HEART RATE: 102 BPM | WEIGHT: 238.1 LBS

## 2017-08-15 DIAGNOSIS — Z98.890 OTHER SPECIFIED POSTPROCEDURAL STATES: Chronic | ICD-10-CM

## 2017-08-15 DIAGNOSIS — D05.11 INTRADUCTAL CARCINOMA IN SITU OF RIGHT BREAST: ICD-10-CM

## 2017-08-15 LAB — RH IG SCN BLD-IMP: POSITIVE — SIGNIFICANT CHANGE UP

## 2017-08-15 PROCEDURE — 38900 IO MAP OF SENT LYMPH NODE: CPT

## 2017-08-15 PROCEDURE — 88331 PATH CONSLTJ SURG 1 BLK 1SPC: CPT | Mod: 26

## 2017-08-15 PROCEDURE — 88342 IMHCHEM/IMCYTCHM 1ST ANTB: CPT | Mod: 26

## 2017-08-15 PROCEDURE — 88341 IMHCHEM/IMCYTCHM EA ADD ANTB: CPT | Mod: 26

## 2017-08-15 PROCEDURE — 19303 MAST SIMPLE COMPLETE: CPT | Mod: 50

## 2017-08-15 PROCEDURE — 15734 MUSCLE-SKIN GRAFT TRUNK: CPT

## 2017-08-15 PROCEDURE — 13101 CMPLX RPR TRUNK 2.6-7.5 CM: CPT | Mod: 59

## 2017-08-15 PROCEDURE — 88304 TISSUE EXAM BY PATHOLOGIST: CPT | Mod: 26

## 2017-08-15 PROCEDURE — 38790 INJECT FOR LYMPHATIC X-RAY: CPT | Mod: 59

## 2017-08-15 PROCEDURE — 19357 TISS XPNDR PLMT BRST RCNSTJ: CPT | Mod: LT

## 2017-08-15 PROCEDURE — 38792 RA TRACER ID OF SENTINL NODE: CPT | Mod: 59

## 2017-08-15 PROCEDURE — 88307 TISSUE EXAM BY PATHOLOGIST: CPT | Mod: 26

## 2017-08-15 PROCEDURE — 38308 INCISION OF LYMPH CHANNELS: CPT

## 2017-08-15 PROCEDURE — 13102 CMPLX RPR TRUNK ADDL 5CM/<: CPT | Mod: 59

## 2017-08-15 PROCEDURE — 38740 REMOVE ARMPIT LYMPH NODES: CPT | Mod: 50,59

## 2017-08-15 RX ORDER — OXYCODONE HYDROCHLORIDE 5 MG/1
10 TABLET ORAL EVERY 4 HOURS
Qty: 0 | Refills: 0 | Status: DISCONTINUED | OUTPATIENT
Start: 2017-08-15 | End: 2017-08-17

## 2017-08-15 RX ORDER — CEFAZOLIN SODIUM 1 G
1000 VIAL (EA) INJECTION EVERY 8 HOURS
Qty: 0 | Refills: 0 | Status: DISCONTINUED | OUTPATIENT
Start: 2017-08-15 | End: 2017-08-17

## 2017-08-15 RX ORDER — HYDROMORPHONE HYDROCHLORIDE 2 MG/ML
0.5 INJECTION INTRAMUSCULAR; INTRAVENOUS; SUBCUTANEOUS
Qty: 0 | Refills: 0 | Status: DISCONTINUED | OUTPATIENT
Start: 2017-08-15 | End: 2017-08-15

## 2017-08-15 RX ORDER — MEPERIDINE HYDROCHLORIDE 50 MG/ML
12.5 INJECTION INTRAMUSCULAR; INTRAVENOUS; SUBCUTANEOUS
Qty: 0 | Refills: 0 | Status: DISCONTINUED | OUTPATIENT
Start: 2017-08-15 | End: 2017-08-17

## 2017-08-15 RX ORDER — ACETAMINOPHEN 500 MG
650 TABLET ORAL EVERY 6 HOURS
Qty: 0 | Refills: 0 | Status: DISCONTINUED | OUTPATIENT
Start: 2017-08-15 | End: 2017-08-17

## 2017-08-15 RX ORDER — INSULIN LISPRO 100/ML
VIAL (ML) SUBCUTANEOUS AT BEDTIME
Qty: 0 | Refills: 0 | Status: DISCONTINUED | OUTPATIENT
Start: 2017-08-15 | End: 2017-08-17

## 2017-08-15 RX ORDER — HEPARIN SODIUM 5000 [USP'U]/ML
5000 INJECTION INTRAVENOUS; SUBCUTANEOUS EVERY 8 HOURS
Qty: 0 | Refills: 0 | Status: DISCONTINUED | OUTPATIENT
Start: 2017-08-15 | End: 2017-08-17

## 2017-08-15 RX ORDER — SODIUM CHLORIDE 9 MG/ML
1000 INJECTION, SOLUTION INTRAVENOUS ONCE
Qty: 0 | Refills: 0 | Status: COMPLETED | OUTPATIENT
Start: 2017-08-15 | End: 2017-08-15

## 2017-08-15 RX ORDER — INSULIN LISPRO 100/ML
VIAL (ML) SUBCUTANEOUS
Qty: 0 | Refills: 0 | Status: DISCONTINUED | OUTPATIENT
Start: 2017-08-15 | End: 2017-08-17

## 2017-08-15 RX ORDER — OXYCODONE HYDROCHLORIDE 5 MG/1
5 TABLET ORAL EVERY 4 HOURS
Qty: 0 | Refills: 0 | Status: DISCONTINUED | OUTPATIENT
Start: 2017-08-15 | End: 2017-08-17

## 2017-08-15 RX ORDER — HYDROMORPHONE HYDROCHLORIDE 2 MG/ML
1 INJECTION INTRAMUSCULAR; INTRAVENOUS; SUBCUTANEOUS
Qty: 0 | Refills: 0 | Status: DISCONTINUED | OUTPATIENT
Start: 2017-08-15 | End: 2017-08-15

## 2017-08-15 RX ORDER — SODIUM CHLORIDE 9 MG/ML
1000 INJECTION, SOLUTION INTRAVENOUS
Qty: 0 | Refills: 0 | Status: DISCONTINUED | OUTPATIENT
Start: 2017-08-15 | End: 2017-08-15

## 2017-08-15 RX ORDER — SODIUM CHLORIDE 9 MG/ML
1000 INJECTION, SOLUTION INTRAVENOUS
Qty: 0 | Refills: 0 | Status: DISCONTINUED | OUTPATIENT
Start: 2017-08-15 | End: 2017-08-17

## 2017-08-15 RX ORDER — ACETAMINOPHEN 500 MG
1000 TABLET ORAL ONCE
Qty: 0 | Refills: 0 | Status: COMPLETED | OUTPATIENT
Start: 2017-08-15 | End: 2017-08-15

## 2017-08-15 RX ORDER — SODIUM CHLORIDE 9 MG/ML
1000 INJECTION, SOLUTION INTRAVENOUS
Qty: 0 | Refills: 0 | Status: DISCONTINUED | OUTPATIENT
Start: 2017-08-15 | End: 2017-08-16

## 2017-08-15 RX ADMIN — SODIUM CHLORIDE 3 MILLILITER(S): 9 INJECTION INTRAMUSCULAR; INTRAVENOUS; SUBCUTANEOUS at 13:58

## 2017-08-15 RX ADMIN — Medication 650 MILLIGRAM(S): at 22:51

## 2017-08-15 RX ADMIN — HEPARIN SODIUM 5000 UNIT(S): 5000 INJECTION INTRAVENOUS; SUBCUTANEOUS at 21:43

## 2017-08-15 RX ADMIN — HYDROMORPHONE HYDROCHLORIDE 0.5 MILLIGRAM(S): 2 INJECTION INTRAMUSCULAR; INTRAVENOUS; SUBCUTANEOUS at 14:27

## 2017-08-15 RX ADMIN — SODIUM CHLORIDE 50 MILLILITER(S): 9 INJECTION, SOLUTION INTRAVENOUS at 14:30

## 2017-08-15 RX ADMIN — Medication 650 MILLIGRAM(S): at 23:51

## 2017-08-15 RX ADMIN — Medication 100 MILLIGRAM(S): at 21:43

## 2017-08-15 RX ADMIN — HYDROMORPHONE HYDROCHLORIDE 1 MILLIGRAM(S): 2 INJECTION INTRAMUSCULAR; INTRAVENOUS; SUBCUTANEOUS at 13:05

## 2017-08-15 RX ADMIN — Medication 1000 MILLIGRAM(S): at 17:00

## 2017-08-15 RX ADMIN — Medication 400 MILLIGRAM(S): at 16:44

## 2017-08-15 RX ADMIN — SODIUM CHLORIDE 50 MILLILITER(S): 9 INJECTION, SOLUTION INTRAVENOUS at 13:58

## 2017-08-15 RX ADMIN — Medication 100 MILLIGRAM(S): at 15:54

## 2017-08-15 RX ADMIN — SODIUM CHLORIDE 3 MILLILITER(S): 9 INJECTION INTRAMUSCULAR; INTRAVENOUS; SUBCUTANEOUS at 21:20

## 2017-08-15 RX ADMIN — HYDROMORPHONE HYDROCHLORIDE 0.5 MILLIGRAM(S): 2 INJECTION INTRAMUSCULAR; INTRAVENOUS; SUBCUTANEOUS at 14:12

## 2017-08-15 RX ADMIN — HYDROMORPHONE HYDROCHLORIDE 1 MILLIGRAM(S): 2 INJECTION INTRAMUSCULAR; INTRAVENOUS; SUBCUTANEOUS at 13:15

## 2017-08-15 RX ADMIN — SODIUM CHLORIDE 1333.33 MILLILITER(S): 9 INJECTION, SOLUTION INTRAVENOUS at 15:13

## 2017-08-15 NOTE — CHART NOTE - NSCHARTNOTEFT_GEN_A_CORE
Notified by RN that patient's HR was 104. All other vital signs were stable. Patient was endorsing severe pain at the time these set of vitals were taken as she was due for her pain medication. Instructed nurse to give patient the pain medication and repeat HR in thirty minutes. Will continue to monitor.

## 2017-08-15 NOTE — PROGRESS NOTE ADULT - SUBJECTIVE AND OBJECTIVE BOX
STATUS POST:  Right mastectomy, Left mastectomy, B/L Axillary SNLB, B/L reconstruction w/ TE        SUBJECTIVE: Pt seen at bedside.       Gen:  Pul:  cardiac:  GI:  MSK:      Vital Signs Last 24 Hrs  T(C): 36.8 (15 Aug 2017 17:00), Max: 36.8 (15 Aug 2017 06:17)  T(F): 98.2 (15 Aug 2017 17:00), Max: 98.3 (15 Aug 2017 06:17)  HR: 108 (15 Aug 2017 17:00) (91 - 115)  BP: 163/89 (15 Aug 2017 17:00) (123/63 - 163/89)  BP(mean): --  RR: 14 (15 Aug 2017 17:00) (10 - 16)  SpO2: 100% (15 Aug 2017 17:00) (98% - 100%)  I&O's Summary    15 Aug 2017 07:01  -  15 Aug 2017 18:59  --------------------------------------------------------  IN: 360 mL / OUT: 830.5 mL / NET: -470.5 mL      I&O's Detail    15 Aug 2017 07:01  -  15 Aug 2017 18:59  --------------------------------------------------------  IN:    lactated ringers.: 150 mL    lactated ringers.: 150 mL    Oral Fluid: 60 mL  Total IN: 360 mL    OUT:    Bulb: 5 mL    Bulb: 15.5 mL    Bulb: 50 mL    Bulb: 65 mL    Indwelling Catheter - Urethral: 695 mL  Total OUT: 830.5 mL    Total NET: -470.5 mL          MEDICATIONS  (STANDING):  sodium chloride 0.9% lock flush 3 milliLiter(s) IV Push every 8 hours  heparin  Injectable 5000 Unit(s) SubCutaneous every 8 hours  ceFAZolin   IVPB 1000 milliGRAM(s) IV Intermittent every 8 hours  lactated ringers. 1000 milliLiter(s) (50 mL/Hr) IV Continuous <Continuous>    MEDICATIONS  (PRN):  HYDROmorphone  Injectable 1 milliGRAM(s) IV Push every 10 minutes PRN Severe Pain (7 - 10)  HYDROmorphone  Injectable 0.5 milliGRAM(s) IV Push every 10 minutes PRN Moderate Pain (4 - 6)  promethazine IVPB 6.25 milliGRAM(s) IV Intermittent once PRN Nausea and/or Vomiting  meperidine     Injectable 12.5 milliGRAM(s) IV Push every 10 minutes PRN Shivering STATUS POST:  Right mastectomy, Left mastectomy, B/L Axillary SNLB, B/L reconstruction w/ TE        SUBJECTIVE: Pt seen at bedside, was already transferred to the floor. Was doing well post-op. Endorsed good pain control, no nausea, vomiting, fever, chills, SOB, CP or lightheadedness . Described feeling tired, but otherwise no complaints. Tolerating diet. Voiding and ambulating without issue.        Gen: Sitting in bed, conversing with family at bedside, NAD  Pul: Breathing comfortably on RA  cardiac: RRR on monitor  MSK: Mastectomy dressings intact, no evidence of bleeding, oozing swelling, or bruising. 4 JPs in place suctioning serosanguinous fluid.       Vital Signs Last 24 Hrs  T(C): 36.8 (15 Aug 2017 17:00), Max: 36.8 (15 Aug 2017 06:17)  T(F): 98.2 (15 Aug 2017 17:00), Max: 98.3 (15 Aug 2017 06:17)  HR: 108 (15 Aug 2017 17:00) (91 - 115)  BP: 163/89 (15 Aug 2017 17:00) (123/63 - 163/89)  BP(mean): --  RR: 14 (15 Aug 2017 17:00) (10 - 16)  SpO2: 100% (15 Aug 2017 17:00) (98% - 100%)  I&O's Summary    15 Aug 2017 07:01  -  15 Aug 2017 18:59  --------------------------------------------------------  IN: 360 mL / OUT: 830.5 mL / NET: -470.5 mL      I&O's Detail    15 Aug 2017 07:01  -  15 Aug 2017 18:59  --------------------------------------------------------  IN:    lactated ringers.: 150 mL    lactated ringers.: 150 mL    Oral Fluid: 60 mL  Total IN: 360 mL    OUT:    Bulb: 5 mL    Bulb: 15.5 mL    Bulb: 50 mL    Bulb: 65 mL    Indwelling Catheter - Urethral: 695 mL  Total OUT: 830.5 mL    Total NET: -470.5 mL          MEDICATIONS  (STANDING):  sodium chloride 0.9% lock flush 3 milliLiter(s) IV Push every 8 hours  heparin  Injectable 5000 Unit(s) SubCutaneous every 8 hours  ceFAZolin   IVPB 1000 milliGRAM(s) IV Intermittent every 8 hours  lactated ringers. 1000 milliLiter(s) (50 mL/Hr) IV Continuous <Continuous>    MEDICATIONS  (PRN):  HYDROmorphone  Injectable 1 milliGRAM(s) IV Push every 10 minutes PRN Severe Pain (7 - 10)  HYDROmorphone  Injectable 0.5 milliGRAM(s) IV Push every 10 minutes PRN Moderate Pain (4 - 6)  promethazine IVPB 6.25 milliGRAM(s) IV Intermittent once PRN Nausea and/or Vomiting  meperidine     Injectable 12.5 milliGRAM(s) IV Push every 10 minutes PRN Shivering

## 2017-08-15 NOTE — PROGRESS NOTE ADULT - ASSESSMENT
A/P: 57F s/p  Right mastectomy, Left mastectomy, B/L Axillary SNLB, B/L reconstruction w/ TE  - Diet: Regular  - Activity: as tolerated  - Labs: f/u in AM  - Pain medication: c/w current regimen   - DVT ppx: HSQ  - Monitor PRINCESS output quality and volume

## 2017-08-16 ENCOUNTER — TRANSCRIPTION ENCOUNTER (OUTPATIENT)
Age: 57
End: 2017-08-16

## 2017-08-16 RX ORDER — HYDROCHLOROTHIAZIDE 25 MG
25 TABLET ORAL DAILY
Qty: 0 | Refills: 0 | Status: DISCONTINUED | OUTPATIENT
Start: 2017-08-16 | End: 2017-08-17

## 2017-08-16 RX ORDER — LOSARTAN POTASSIUM 100 MG/1
100 TABLET, FILM COATED ORAL DAILY
Qty: 0 | Refills: 0 | Status: DISCONTINUED | OUTPATIENT
Start: 2017-08-16 | End: 2017-08-17

## 2017-08-16 RX ORDER — DIAZEPAM 5 MG
1 TABLET ORAL
Qty: 12 | Refills: 0 | OUTPATIENT
Start: 2017-08-16 | End: 2017-08-19

## 2017-08-16 RX ORDER — ACETAMINOPHEN 500 MG
2 TABLET ORAL
Qty: 0 | Refills: 0 | COMMUNITY
Start: 2017-08-16

## 2017-08-16 RX ORDER — SODIUM CHLORIDE 9 MG/ML
3 INJECTION INTRAMUSCULAR; INTRAVENOUS; SUBCUTANEOUS EVERY 8 HOURS
Qty: 0 | Refills: 0 | Status: DISCONTINUED | OUTPATIENT
Start: 2017-08-16 | End: 2017-08-17

## 2017-08-16 RX ORDER — NIFEDIPINE 30 MG
30 TABLET, EXTENDED RELEASE 24 HR ORAL DAILY
Qty: 0 | Refills: 0 | Status: DISCONTINUED | OUTPATIENT
Start: 2017-08-16 | End: 2017-08-17

## 2017-08-16 RX ORDER — DIAZEPAM 5 MG
5 TABLET ORAL EVERY 6 HOURS
Qty: 0 | Refills: 0 | Status: DISCONTINUED | OUTPATIENT
Start: 2017-08-16 | End: 2017-08-17

## 2017-08-16 RX ORDER — CEPHALEXIN 500 MG
1 CAPSULE ORAL
Qty: 20 | Refills: 0 | OUTPATIENT
Start: 2017-08-16 | End: 2017-08-21

## 2017-08-16 RX ORDER — OXYCODONE HYDROCHLORIDE 5 MG/1
1 TABLET ORAL
Qty: 12 | Refills: 0 | OUTPATIENT
Start: 2017-08-16 | End: 2017-08-18

## 2017-08-16 RX ADMIN — LOSARTAN POTASSIUM 100 MILLIGRAM(S): 100 TABLET, FILM COATED ORAL at 10:59

## 2017-08-16 RX ADMIN — Medication 30 MILLIGRAM(S): at 10:58

## 2017-08-16 RX ADMIN — OXYCODONE HYDROCHLORIDE 10 MILLIGRAM(S): 5 TABLET ORAL at 09:46

## 2017-08-16 RX ADMIN — OXYCODONE HYDROCHLORIDE 5 MILLIGRAM(S): 5 TABLET ORAL at 05:20

## 2017-08-16 RX ADMIN — SODIUM CHLORIDE 3 MILLILITER(S): 9 INJECTION INTRAMUSCULAR; INTRAVENOUS; SUBCUTANEOUS at 13:14

## 2017-08-16 RX ADMIN — HEPARIN SODIUM 5000 UNIT(S): 5000 INJECTION INTRAVENOUS; SUBCUTANEOUS at 13:24

## 2017-08-16 RX ADMIN — OXYCODONE HYDROCHLORIDE 10 MILLIGRAM(S): 5 TABLET ORAL at 16:30

## 2017-08-16 RX ADMIN — HEPARIN SODIUM 5000 UNIT(S): 5000 INJECTION INTRAVENOUS; SUBCUTANEOUS at 05:58

## 2017-08-16 RX ADMIN — SODIUM CHLORIDE 3 MILLILITER(S): 9 INJECTION INTRAMUSCULAR; INTRAVENOUS; SUBCUTANEOUS at 21:42

## 2017-08-16 RX ADMIN — SODIUM CHLORIDE 3 MILLILITER(S): 9 INJECTION INTRAMUSCULAR; INTRAVENOUS; SUBCUTANEOUS at 05:43

## 2017-08-16 RX ADMIN — Medication 100 MILLIGRAM(S): at 22:20

## 2017-08-16 RX ADMIN — OXYCODONE HYDROCHLORIDE 10 MILLIGRAM(S): 5 TABLET ORAL at 10:30

## 2017-08-16 RX ADMIN — OXYCODONE HYDROCHLORIDE 5 MILLIGRAM(S): 5 TABLET ORAL at 04:20

## 2017-08-16 RX ADMIN — Medication 25 MILLIGRAM(S): at 10:59

## 2017-08-16 RX ADMIN — OXYCODONE HYDROCHLORIDE 10 MILLIGRAM(S): 5 TABLET ORAL at 15:53

## 2017-08-16 RX ADMIN — Medication 5 MILLIGRAM(S): at 13:26

## 2017-08-16 RX ADMIN — Medication 100 MILLIGRAM(S): at 05:58

## 2017-08-16 RX ADMIN — Medication 5 MILLIGRAM(S): at 23:56

## 2017-08-16 RX ADMIN — Medication 100 MILLIGRAM(S): at 13:24

## 2017-08-16 RX ADMIN — SODIUM CHLORIDE 50 MILLILITER(S): 9 INJECTION, SOLUTION INTRAVENOUS at 09:46

## 2017-08-16 RX ADMIN — HEPARIN SODIUM 5000 UNIT(S): 5000 INJECTION INTRAVENOUS; SUBCUTANEOUS at 22:21

## 2017-08-16 NOTE — DISCHARGE NOTE ADULT - CARE PROVIDER_API CALL
Elbert Savage), Surgery  [address] [place] [address]  Phone: [phone]  Fax: [phone]    Bill Ramachandran), ColonRectal Surgery; Plastic Surgery; Surgery; Surgery of the Hand  [address] [place] [address]  Phone: [phone]  Fax: [phone]

## 2017-08-16 NOTE — DISCHARGE NOTE ADULT - INSTRUCTIONS
Regular diet as tolerated Notify MD if experiencing fever, nausea, vomiting, increased pain, bleeding or if drain pulls out. Empty and care for drains as instructed.

## 2017-08-16 NOTE — DISCHARGE NOTE ADULT - CONDITIONS AT DISCHARGE
Pt ambulating and voiding without difficulty. Pt tolerates diet. Pt is A+OX4. Pt's VSS. Pt has JPX4 draining small amounts of serosanguineous fluid to self suction. Pt received drain supplies and education on drain care.

## 2017-08-16 NOTE — DISCHARGE NOTE ADULT - ADDITIONAL INSTRUCTIONS
Please call Dr. Savage to make a follow up appointment in one week [phone]   Please follow up with Dr. Ramachandran within one week of discharge, call office for appointment.

## 2017-08-16 NOTE — DISCHARGE NOTE ADULT - CARE PROVIDERS DIRECT ADDRESSES
[email or web address]

## 2017-08-16 NOTE — DISCHARGE NOTE ADULT - PATIENT PORTAL LINK FT
“You can access the FollowHealth Patient Portal, offered by [facility], by registering with the following website: [email or web address]”

## 2017-08-16 NOTE — PROGRESS NOTE ADULT - SUBJECTIVE AND OBJECTIVE BOX
D TEAM SURGERY PROGRESS NOTE     SUBJECTIVE: Patient seen and examined at bedside, patient without complaints.      Gen: A&Ox3   Pul: Breathing comfortably on RA  cardiac: RRR on monitor  Mastectomy dressings intact, no evidence of bleeding, oozing swelling, or bruising. 4 JPs in place suctioning serosanguinous fluid.       Vital Signs Last 24 Hrs  T(C): 37 (16 Aug 2017 06:05), Max: 37 (16 Aug 2017 06:05)  T(F): 98.6 (16 Aug 2017 06:05), Max: 98.6 (16 Aug 2017 06:05)  HR: 92 (16 Aug 2017 06:05) (91 - 115)  BP: 140/76 (16 Aug 2017 06:05) (123/63 - 163/89)  BP(mean): --  RR: 20 (16 Aug 2017 06:05) (10 - 20)  SpO2: 99% (16 Aug 2017 06:05) (98% - 100%)  I&O's Detail    15 Aug 2017 07:01  -  16 Aug 2017 07:00  --------------------------------------------------------  IN:    lactated ringers.: 150 mL    lactated ringers.: 150 mL    Oral Fluid: 60 mL  Total IN: 360 mL    OUT:    Bulb: 127.5 mL    Bulb: 23 mL    Bulb: 48 mL    Bulb: 77.5 mL    Indwelling Catheter - Urethral: 1945 mL  Total OUT: 2221 mL    Total NET: -1861 mL    A/P: 57F s/p  Right mastectomy, Left mastectomy, B/L Axillary SNLB, B/L reconstruction w/ TE  - Diet: Regular  - Activity: as tolerated  - Labs: f/u in AM  - Pain medication: c/w current regimen   - DVT ppx: HSQ  - Monitor PRINCESS output quality and volume  - DC plan for tomorrow  -DC qasim, f/u TOV    MEDICATIONS  (STANDING):  sodium chloride 0.9% lock flush 3 milliLiter(s) IV Push every 8 hours  heparin  Injectable 5000 Unit(s) SubCutaneous every 8 hours  ceFAZolin   IVPB 1000 milliGRAM(s) IV Intermittent every 8 hours  lactated ringers. 1000 milliLiter(s) (50 mL/Hr) IV Continuous <Continuous>  insulin lispro (HumaLOG) corrective regimen sliding scale   SubCutaneous three times a day before meals  insulin lispro (HumaLOG) corrective regimen sliding scale   SubCutaneous at bedtime  dextrose 5%. 1000 milliLiter(s) (50 mL/Hr) IV Continuous <Continuous>    MEDICATIONS  (PRN):  promethazine IVPB 6.25 milliGRAM(s) IV Intermittent once PRN Nausea and/or Vomiting  meperidine     Injectable 12.5 milliGRAM(s) IV Push every 10 minutes PRN Shivering  acetaminophen   Tablet. 650 milliGRAM(s) Oral every 6 hours PRN Mild Pain (1 - 3)  oxyCODONE    IR 5 milliGRAM(s) Oral every 4 hours PRN Moderate Pain (4 - 6)  oxyCODONE    IR 10 milliGRAM(s) Oral every 4 hours PRN Severe Pain (7 - 10)      Physical Exam  General: A&Ox3, NAD  Abdominal:     LABS:                  Patient is a 57y Female D TEAM SURGERY PROGRESS NOTE     SUBJECTIVE: Patient seen and examined at bedside, patient without complaints.      Gen: A&Ox3   Pul: Breathing comfortably on RA  cardiac: RRR on monitor  Mastectomy dressings intact, no evidence of bleeding, oozing swelling, or bruising. 4 JPs in place suctioning serosanguinous fluid.       Vital Signs Last 24 Hrs  T(C): 37 (16 Aug 2017 06:05), Max: 37 (16 Aug 2017 06:05)  T(F): 98.6 (16 Aug 2017 06:05), Max: 98.6 (16 Aug 2017 06:05)  HR: 92 (16 Aug 2017 06:05) (91 - 115)  BP: 140/76 (16 Aug 2017 06:05) (123/63 - 163/89)  BP(mean): --  RR: 20 (16 Aug 2017 06:05) (10 - 20)  SpO2: 99% (16 Aug 2017 06:05) (98% - 100%)  I&O's Detail    15 Aug 2017 07:01  -  16 Aug 2017 07:00  --------------------------------------------------------  IN:    lactated ringers.: 150 mL    lactated ringers.: 150 mL    Oral Fluid: 60 mL  Total IN: 360 mL    OUT:    Bulb: 127.5 mL    Bulb: 23 mL    Bulb: 48 mL    Bulb: 77.5 mL    Indwelling Catheter - Urethral: 1945 mL  Total OUT: 2221 mL    Total NET: -1861 mL    A/P: 57F s/p  Right mastectomy, Left mastectomy, B/L Axillary SNLB, B/L reconstruction w/ TE  - Diet: Regular  - Activity: as tolerated  - Labs: f/u in AM  - Pain medication: c/w current regimen   - DVT ppx: HSQ  - Monitor PRINCESS output quality and volume  - DC plan for tomorrow  -DC qasim, f/u TOV

## 2017-08-16 NOTE — PROGRESS NOTE ADULT - ATTENDING COMMENTS
Pt seen and examined. Agree with above.  Flaps viable, haydee serosang    Plan  OOB  Pain control  Cont HAYDEE  Haydee teachin  D/c planning

## 2017-08-16 NOTE — DISCHARGE NOTE ADULT - PLAN OF CARE
You had surgery, pain control WOUND CARE:  Keep incisions clean, dry and in tact. Record PRINCESS drain output daily. Record output and bring record to follow up appointment with surgeon. Sponge bath only until follow up appointment with Plastic surgeon, Dr. Ramachandran.   BATHING: Please do not submerge wound underwater. You may shower and/or sponge bathe.  ACTIVITY: No heavy lifting or straining. Otherwise, you may return to your usual level of physical activity. If you are taking narcotic pain medication (such as Percocet) DO NOT drive a car, operate machinery or make important decisions.  DIET: Return to your usual diet.  NOTIFY YOUR SURGEON IF: You have any bleeding that does not stop, any pus draining from your wound(s), any fever (over 100.4 F) or chills, persistent nausea/vomiting, persistent diarrhea, or if your pain is not controlled on your discharge pain medications.  FOLLOW-UP: Please follow up with your primary care physician in one week regarding your hospitalization Please follow up with primary medical doctor

## 2017-08-16 NOTE — PROGRESS NOTE ADULT - SUBJECTIVE AND OBJECTIVE BOX
SUBJECTIVE:  Doing well.   No overnight events. Would like to stay until tomorrow because she does not have anyone at home to help until then.    OBJECTIVE:     ** VITAL SIGNS / I&O's **    Vital Signs Last 24 Hrs  T(C): 37 (16 Aug 2017 06:05), Max: 37 (16 Aug 2017 06:05)  T(F): 98.6 (16 Aug 2017 06:05), Max: 98.6 (16 Aug 2017 06:05)  HR: 92 (16 Aug 2017 06:05) (91 - 115)  BP: 140/76 (16 Aug 2017 06:05) (123/63 - 163/89)  BP(mean): --  RR: 20 (16 Aug 2017 06:05) (10 - 20)  SpO2: 99% (16 Aug 2017 06:05) (98% - 100%)      15 Aug 2017 07:01  -  16 Aug 2017 06:57  --------------------------------------------------------  IN:    lactated ringers.: 150 mL    lactated ringers.: 150 mL    Oral Fluid: 60 mL  Total IN: 360 mL    OUT:    Bulb: 127.5 mL    Bulb: 23 mL    Bulb: 48 mL    Bulb: 77.5 mL    Indwelling Catheter - Urethral: 1945 mL  Total OUT: 2221 mL    Total NET: -1861 mL    ** PHYSICAL EXAM **    -- CONSTITUTIONAL: AOx3. NAD.   -- CHEST: incisions c/d/i with dermabond prineo, and buried sutures, drains x4 in place with serosanguinous, covered with ABD pads and surgical bra  -- EXTREMITIES: WWP      ** LABS **    CAPILLARY BLOOD GLUCOSE  124 (15 Aug 2017 21:34)      A/P: 58F POD#1 s/p b/l mastectomy and TE placement. Doing well.  - OOB  - d/c tripp  - Pain control  - IS  - Ambulate  - DVT prophylaxis with SQH  - may go home from plastic surgery perspective, needs keflex for 5 days, f/u with Dr. Ramachandran within 1 week, [facility] for drain care.

## 2017-08-16 NOTE — DISCHARGE NOTE ADULT - MEDICATION SUMMARY - MEDICATIONS TO TAKE
I will START or STAY ON the medications listed below when I get home from the hospital:    acetaminophen 325 mg oral tablet  -- 2 tab(s) by mouth every 6 hours, As needed, Mild Pain (1 - 3)  -- Indication: For mild pain    oxyCODONE 5 mg oral tablet  -- 1 tab(s) by mouth every 4 hours, As needed, Moderate Pain (4 - 6) MDD:4 tab(s)  -- Indication: For moderate to severe pain    diazePAM 5 mg oral tablet  -- 1 tab(s) by mouth every 6 hours, As Needed -for muscle spasm MDD:4 tab(s)  -- Indication: For as needed for muscle spasm    metFORMIN 500 mg oral tablet  -- 1 tab(s) by mouth 2 times a day  -- Indication: For DM    olmesartan-hydroCHLOROthiazide 40mg-25mg oral tablet  -- 1 tab(s) by mouth once a day  -- Indication: For HTN    ProAir HFA 90 mcg/inh inhalation aerosol  -- 2 puff(s) inhaled 4 times a day, As Needed  -- Indication: For Home medication    NIFEdipine 30 mg oral tablet, extended release  -- 1 tab(s) by mouth once a day  -- Indication: For HTN    Keflex 500 mg oral capsule  -- 1 cap(s) by mouth 4 times a day  -- Finish all this medication unless otherwise directed by prescriber.    -- Indication: For Antibiotic I will START or STAY ON the medications listed below when I get home from the hospital:    acetaminophen 325 mg oral tablet  -- 2 tab(s) by mouth every 6 hours, As needed, Mild Pain (1 - 3)  -- Indication: For mild pain    oxyCODONE 5 mg oral tablet  -- 1 tab(s) by mouth every 4 hours, As needed, Moderate Pain (4 - 6) MDD:4 tab(s)  -- Indication: For severe pain    diazePAM 5 mg oral tablet  -- 1 tab(s) by mouth every 6 hours, As Needed -for muscle spasm MDD:4 tab(s)  -- Indication: For as needed for muscle spasm    metFORMIN 500 mg oral tablet  -- 1 tab(s) by mouth 2 times a day  -- Indication: For DM    olmesartan-hydroCHLOROthiazide 40mg-25mg oral tablet  -- 1 tab(s) by mouth once a day  -- Indication: For HTN (hypertension)    ProAir HFA 90 mcg/inh inhalation aerosol  -- 2 puff(s) inhaled 4 times a day, As Needed  -- Indication: For Home medication    NIFEdipine 30 mg oral tablet, extended release  -- 1 tab(s) by mouth once a day  -- Indication: For HTN (hypertension)    Keflex 500 mg oral capsule  -- 1 cap(s) by mouth 4 times a day  -- Finish all this medication unless otherwise directed by prescriber.    -- Indication: For Antibiotic    docusate sodium 100 mg oral capsule  -- 1 cap(s) by mouth once a day  -- Indication: For laxative    senna oral tablet  -- 2 tab(s) by mouth once a day (at bedtime)  -- Indication: For laxative

## 2017-08-17 VITALS
TEMPERATURE: 99 F | OXYGEN SATURATION: 100 % | HEART RATE: 100 BPM | DIASTOLIC BLOOD PRESSURE: 76 MMHG | RESPIRATION RATE: 18 BRPM | SYSTOLIC BLOOD PRESSURE: 122 MMHG

## 2017-08-17 PROCEDURE — 99238 HOSP IP/OBS DSCHRG MGMT 30/<: CPT | Mod: 24

## 2017-08-17 RX ORDER — NIFEDIPINE 30 MG
1 TABLET, EXTENDED RELEASE 24 HR ORAL
Qty: 7 | Refills: 0
Start: 2017-08-17 | End: 2017-08-24

## 2017-08-17 RX ORDER — DIAZEPAM 5 MG
1 TABLET ORAL
Qty: 12 | Refills: 0 | OUTPATIENT
Start: 2017-08-17 | End: 2017-08-20

## 2017-08-17 RX ORDER — CEPHALEXIN 500 MG
1 CAPSULE ORAL
Qty: 28 | Refills: 0 | OUTPATIENT
Start: 2017-08-17 | End: 2017-08-24

## 2017-08-17 RX ORDER — NIFEDIPINE 30 MG
1 TABLET, EXTENDED RELEASE 24 HR ORAL
Qty: 0 | Refills: 0 | COMMUNITY

## 2017-08-17 RX ORDER — OXYCODONE HYDROCHLORIDE 5 MG/1
1 TABLET ORAL
Qty: 12 | Refills: 0 | OUTPATIENT
Start: 2017-08-17 | End: 2017-08-19

## 2017-08-17 RX ORDER — DOCUSATE SODIUM 100 MG
1 CAPSULE ORAL
Qty: 0 | Refills: 0 | COMMUNITY
Start: 2017-08-17

## 2017-08-17 RX ORDER — SENNA PLUS 8.6 MG/1
2 TABLET ORAL
Qty: 0 | Refills: 0 | COMMUNITY
Start: 2017-08-17

## 2017-08-17 RX ORDER — OLMESARTAN MEDOXOMIL-HYDROCHLOROTHIAZIDE 25; 40 MG/1; MG/1
1 TABLET, FILM COATED ORAL
Qty: 7 | Refills: 0
Start: 2017-08-17 | End: 2017-08-24

## 2017-08-17 RX ORDER — DOCUSATE SODIUM 100 MG
100 CAPSULE ORAL DAILY
Qty: 0 | Refills: 0 | Status: DISCONTINUED | OUTPATIENT
Start: 2017-08-17 | End: 2017-08-17

## 2017-08-17 RX ORDER — SENNA PLUS 8.6 MG/1
2 TABLET ORAL AT BEDTIME
Qty: 0 | Refills: 0 | Status: DISCONTINUED | OUTPATIENT
Start: 2017-08-17 | End: 2017-08-17

## 2017-08-17 RX ADMIN — Medication 25 MILLIGRAM(S): at 12:14

## 2017-08-17 RX ADMIN — HEPARIN SODIUM 5000 UNIT(S): 5000 INJECTION INTRAVENOUS; SUBCUTANEOUS at 05:48

## 2017-08-17 RX ADMIN — Medication 5 MILLIGRAM(S): at 14:52

## 2017-08-17 RX ADMIN — Medication 5 MILLIGRAM(S): at 05:48

## 2017-08-17 RX ADMIN — LOSARTAN POTASSIUM 100 MILLIGRAM(S): 100 TABLET, FILM COATED ORAL at 12:15

## 2017-08-17 RX ADMIN — Medication 30 MILLIGRAM(S): at 12:15

## 2017-08-17 RX ADMIN — SODIUM CHLORIDE 3 MILLILITER(S): 9 INJECTION INTRAMUSCULAR; INTRAVENOUS; SUBCUTANEOUS at 05:32

## 2017-08-17 RX ADMIN — Medication 100 MILLIGRAM(S): at 05:49

## 2017-08-17 NOTE — PROGRESS NOTE ADULT - ASSESSMENT
A/P: 57F  POD 2 s/p  Right mastectomy, Left mastectomy, B/L Axillary SNLB, B/L reconstruction w/ TE    - Pain medication: c/w current regimen   - Monitor PRINCESS output quality and volume  - DC plan home later today

## 2017-08-17 NOTE — PROGRESS NOTE ADULT - SUBJECTIVE AND OBJECTIVE BOX
SUBJECTIVE:  Doing well.   No overnight events.     OBJECTIVE:     ** VITAL SIGNS / I&O's **    Vital Signs Last 24 Hrs  T(C): 37.3 (17 Aug 2017 09:07), Max: 37.3 (17 Aug 2017 09:07)  T(F): 99.1 (17 Aug 2017 09:07), Max: 99.1 (17 Aug 2017 09:07)  HR: 100 (17 Aug 2017 09:07) (98 - 107)  BP: 122/76 (17 Aug 2017 09:07) (111/65 - 149/72)  BP(mean): --  RR: 18 (17 Aug 2017 09:07) (16 - 20)  SpO2: 100% (17 Aug 2017 09:07) (99% - 100%)      16 Aug 2017 07:01  -  17 Aug 2017 07:00  --------------------------------------------------------  IN:    lactated ringers.: 150 mL  Total IN: 150 mL    OUT:    Bulb: 37.5 mL    Bulb: 32.5 mL    Bulb: 45 mL    Bulb: 102.5 mL    Voided: 1625 mL  Total OUT: 1842.5 mL    Total NET: -1692.5 mL          ** PHYSICAL EXAM **    -- CONSTITUTIONAL: AOx3. NAD.   -- CHEST: breasts are soft, without collections, drains in place with serosanguinous fluid, surgical bra and ABDs in place  -- ABDOMEN: soft nt, obese  -- EXTREMITIES: WWP      ** LABS **  no new labs            CAPILLARY BLOOD GLUCOSE  136 (17 Aug 2017 09:07)  115 (16 Aug 2017 21:25)  112 (16 Aug 2017 17:42)  145 (16 Aug 2017 11:50)                A/P: 58F with breast cancer POD#2      s/p  mastectomies and reconstruction with b/l tissue expanders.  -c/w cefazolin  - pain control with oxy, tylenol  - muscle relaxation with valium prn  - drain care  - keep breasts in surgical bra with ABD padding  - ambulate  - regular diet  - d/c home per D team, with keflex x 1 week, sponge bath only, f/u with Dr Ramachandran within 1 week            - Pain control  - IS  - Ambulate  - DVT prophylaxis

## 2017-08-17 NOTE — PROGRESS NOTE ADULT - SUBJECTIVE AND OBJECTIVE BOX
D TEAM SURGERY DAILY PROGRESS NOTE:       Subjective: Patient examined at bedside. No issues overnight. Had some pain from muscle spasm which improved with valium. Otherwise, pain well controlled, voiding and ambulating without issue. Denied nausea vomiting fever, chills or uncontrolled pain.       Objective:  Gen: NAD, AAOx3  MSK: Mastectomy incisions CDI, no evidence of bleeding, oozing swelling, or bruising. 4 JPs in place suctioning serosanguinous fluid      MEDICATIONS  (STANDING):  sodium chloride 0.9% lock flush 3 milliLiter(s) IV Push every 8 hours  heparin  Injectable 5000 Unit(s) SubCutaneous every 8 hours  ceFAZolin   IVPB 1000 milliGRAM(s) IV Intermittent every 8 hours  insulin lispro (HumaLOG) corrective regimen sliding scale   SubCutaneous three times a day before meals  insulin lispro (HumaLOG) corrective regimen sliding scale   SubCutaneous at bedtime  dextrose 5%. 1000 milliLiter(s) (50 mL/Hr) IV Continuous <Continuous>  NIFEdipine XL 30 milliGRAM(s) Oral daily  losartan 100 milliGRAM(s) Oral daily  hydrochlorothiazide 25 milliGRAM(s) Oral daily  diazepam    Tablet 5 milliGRAM(s) Oral every 6 hours  sodium chloride 0.9% lock flush 3 milliLiter(s) IV Push every 8 hours  docusate sodium 100 milliGRAM(s) Oral daily  senna 2 Tablet(s) Oral at bedtime    MEDICATIONS  (PRN):  meperidine     Injectable 12.5 milliGRAM(s) IV Push every 10 minutes PRN Shivering  acetaminophen   Tablet. 650 milliGRAM(s) Oral every 6 hours PRN Mild Pain (1 - 3)  oxyCODONE    IR 5 milliGRAM(s) Oral every 4 hours PRN Moderate Pain (4 - 6)  oxyCODONE    IR 10 milliGRAM(s) Oral every 4 hours PRN Severe Pain (7 - 10)      Vital Signs Last 24 Hrs  T(C): 37.3 (17 Aug 2017 09:07), Max: 37.3 (17 Aug 2017 09:07)  T(F): 99.1 (17 Aug 2017 09:07), Max: 99.1 (17 Aug 2017 09:07)  HR: 100 (17 Aug 2017 09:07) (98 - 107)  BP: 122/76 (17 Aug 2017 09:07) (111/65 - 149/72)  BP(mean): --  RR: 18 (17 Aug 2017 09:07) (16 - 20)  SpO2: 100% (17 Aug 2017 09:07) (99% - 100%)    I&O's Detail    16 Aug 2017 07:01  -  17 Aug 2017 07:00  --------------------------------------------------------  IN:    lactated ringers.: 150 mL  Total IN: 150 mL    OUT:    Bulb: 37.5 mL    Bulb: 32.5 mL    Bulb: 45 mL    Bulb: 102.5 mL    Voided: 1625 mL  Total OUT: 1842.5 mL    Total NET: -1692.5 mL          Daily     Daily     LABS:                RADIOLOGY & ADDITIONAL STUDIES:

## 2017-08-17 NOTE — PROGRESS NOTE ADULT - SUBJECTIVE AND OBJECTIVE BOX
SURGICAL ONCOLOGY PROGRESS NOTE    Complains of pain.    Vital Signs Last 24 Hrs  T(C): 37.1 (17 Aug 2017 05:44), Max: 37.1 (16 Aug 2017 13:22)  T(F): 98.7 (17 Aug 2017 05:44), Max: 98.7 (16 Aug 2017 13:22)  HR: 107 (17 Aug 2017 05:44) (89 - 107)  BP: 123/74 (17 Aug 2017 05:44) (111/65 - 149/72)  BP(mean): --  RR: 20 (17 Aug 2017 05:44) (16 - 20)  SpO2: 100% (17 Aug 2017 05:44) (99% - 100%)  I&O's Detail    16 Aug 2017 07:01  -  17 Aug 2017 07:00  --------------------------------------------------------  IN:    lactated ringers.: 150 mL  Total IN: 150 mL    OUT:    Bulb: 37.5 mL    Bulb: 32.5 mL    Bulb: 45 mL    Bulb: 102.5 mL    Voided: 1625 mL  Total OUT: 1842.5 mL    Total NET: -1692.5 mL          PE:    A&A  NAD    Mastectomy flaps viable bilaterally

## 2017-08-17 NOTE — PROGRESS NOTE ADULT - ATTENDING COMMENTS
Pt seen and examined. Agree with above.  Flaps are viable, haydee serosang    Plan   Cont HAYDEE  Cont bra  Keep dry  Ok to d/c home from PRS standpoint  Home Abx and pain meds  Follow up next week

## 2017-08-23 ENCOUNTER — APPOINTMENT (OUTPATIENT)
Dept: PLASTIC SURGERY | Facility: CLINIC | Age: 57
End: 2017-08-23
Payer: COMMERCIAL

## 2017-08-23 ENCOUNTER — TRANSCRIPTION ENCOUNTER (OUTPATIENT)
Age: 57
End: 2017-08-23

## 2017-08-23 VITALS
TEMPERATURE: 98.4 F | BODY MASS INDEX: 33.47 KG/M2 | WEIGHT: 226 LBS | HEIGHT: 69 IN | HEART RATE: 102 BPM | RESPIRATION RATE: 16 BRPM | DIASTOLIC BLOOD PRESSURE: 78 MMHG | SYSTOLIC BLOOD PRESSURE: 118 MMHG | OXYGEN SATURATION: 98 %

## 2017-08-23 LAB — SURGICAL PATHOLOGY STUDY: SIGNIFICANT CHANGE UP

## 2017-08-23 PROCEDURE — 99024 POSTOP FOLLOW-UP VISIT: CPT

## 2017-08-23 RX ORDER — FLUTICASONE PROPIONATE AND SALMETEROL 50; 250 UG/1; UG/1
250-50 POWDER RESPIRATORY (INHALATION)
Qty: 60 | Refills: 0 | Status: ACTIVE | COMMUNITY
Start: 2017-08-08

## 2017-08-30 ENCOUNTER — APPOINTMENT (OUTPATIENT)
Dept: PLASTIC SURGERY | Facility: CLINIC | Age: 57
End: 2017-08-30
Payer: COMMERCIAL

## 2017-08-30 VITALS
DIASTOLIC BLOOD PRESSURE: 83 MMHG | HEIGHT: 69 IN | BODY MASS INDEX: 32.88 KG/M2 | SYSTOLIC BLOOD PRESSURE: 119 MMHG | RESPIRATION RATE: 16 BRPM | HEART RATE: 110 BPM | WEIGHT: 222 LBS | TEMPERATURE: 98.4 F

## 2017-08-30 PROCEDURE — 99024 POSTOP FOLLOW-UP VISIT: CPT

## 2017-08-31 ENCOUNTER — APPOINTMENT (OUTPATIENT)
Dept: SURGICAL ONCOLOGY | Facility: CLINIC | Age: 57
End: 2017-08-31
Payer: COMMERCIAL

## 2017-08-31 VITALS
HEIGHT: 69 IN | OXYGEN SATURATION: 100 % | RESPIRATION RATE: 15 BRPM | SYSTOLIC BLOOD PRESSURE: 119 MMHG | DIASTOLIC BLOOD PRESSURE: 78 MMHG | BODY MASS INDEX: 33.18 KG/M2 | WEIGHT: 224 LBS | HEART RATE: 88 BPM

## 2017-08-31 PROCEDURE — 99024 POSTOP FOLLOW-UP VISIT: CPT

## 2017-09-06 ENCOUNTER — APPOINTMENT (OUTPATIENT)
Dept: PLASTIC SURGERY | Facility: CLINIC | Age: 57
End: 2017-09-06
Payer: COMMERCIAL

## 2017-09-06 VITALS
OXYGEN SATURATION: 99 % | WEIGHT: 227 LBS | SYSTOLIC BLOOD PRESSURE: 134 MMHG | BODY MASS INDEX: 33.62 KG/M2 | HEIGHT: 69 IN | DIASTOLIC BLOOD PRESSURE: 86 MMHG | RESPIRATION RATE: 15 BRPM | HEART RATE: 90 BPM

## 2017-09-06 PROCEDURE — 99024 POSTOP FOLLOW-UP VISIT: CPT

## 2017-09-06 RX ORDER — OXYCODONE AND ACETAMINOPHEN 5; 325 MG/1; MG/1
5-325 TABLET ORAL
Qty: 20 | Refills: 0 | Status: DISCONTINUED | COMMUNITY
Start: 2017-08-23 | End: 2017-09-06

## 2017-09-06 RX ORDER — CEPHALEXIN 500 MG/1
500 CAPSULE ORAL
Qty: 28 | Refills: 0 | Status: DISCONTINUED | COMMUNITY
Start: 2017-08-17 | End: 2017-09-06

## 2017-09-06 RX ORDER — OXYCODONE 5 MG/1
5 TABLET ORAL
Qty: 12 | Refills: 0 | Status: DISCONTINUED | COMMUNITY
Start: 2017-08-17 | End: 2017-09-06

## 2017-09-06 RX ORDER — DIAZEPAM 5 MG/1
5 TABLET ORAL
Qty: 12 | Refills: 0 | Status: DISCONTINUED | COMMUNITY
Start: 2017-08-17 | End: 2017-09-06

## 2017-09-06 RX ORDER — SODIUM SULFATE, POTASSIUM SULFATE, MAGNESIUM SULFATE 17.5; 3.13; 1.6 G/ML; G/ML; G/ML
17.5-3.13-1.6 SOLUTION, CONCENTRATE ORAL
Qty: 354 | Refills: 0 | Status: DISCONTINUED | COMMUNITY
Start: 2017-06-13 | End: 2017-09-06

## 2017-09-13 ENCOUNTER — APPOINTMENT (OUTPATIENT)
Dept: PLASTIC SURGERY | Facility: CLINIC | Age: 57
End: 2017-09-13
Payer: COMMERCIAL

## 2017-09-13 VITALS
HEART RATE: 95 BPM | WEIGHT: 227 LBS | HEIGHT: 69 IN | OXYGEN SATURATION: 99 % | DIASTOLIC BLOOD PRESSURE: 67 MMHG | SYSTOLIC BLOOD PRESSURE: 94 MMHG | BODY MASS INDEX: 33.62 KG/M2 | TEMPERATURE: 98.2 F | RESPIRATION RATE: 16 BRPM

## 2017-09-13 PROCEDURE — 99024 POSTOP FOLLOW-UP VISIT: CPT

## 2017-09-21 ENCOUNTER — APPOINTMENT (OUTPATIENT)
Dept: PLASTIC SURGERY | Facility: CLINIC | Age: 57
End: 2017-09-21
Payer: COMMERCIAL

## 2017-09-21 VITALS
HEART RATE: 83 BPM | SYSTOLIC BLOOD PRESSURE: 134 MMHG | DIASTOLIC BLOOD PRESSURE: 86 MMHG | BODY MASS INDEX: 34.07 KG/M2 | HEIGHT: 69 IN | OXYGEN SATURATION: 99 % | RESPIRATION RATE: 15 BRPM | WEIGHT: 230 LBS

## 2017-09-21 PROCEDURE — 99024 POSTOP FOLLOW-UP VISIT: CPT

## 2017-09-27 ENCOUNTER — APPOINTMENT (OUTPATIENT)
Dept: PLASTIC SURGERY | Facility: CLINIC | Age: 57
End: 2017-09-27
Payer: COMMERCIAL

## 2017-09-27 VITALS
SYSTOLIC BLOOD PRESSURE: 115 MMHG | HEIGHT: 69 IN | RESPIRATION RATE: 15 BRPM | OXYGEN SATURATION: 100 % | DIASTOLIC BLOOD PRESSURE: 78 MMHG | BODY MASS INDEX: 34.07 KG/M2 | WEIGHT: 230 LBS | HEART RATE: 107 BPM

## 2017-09-27 PROCEDURE — 99024 POSTOP FOLLOW-UP VISIT: CPT

## 2017-10-04 ENCOUNTER — APPOINTMENT (OUTPATIENT)
Dept: PLASTIC SURGERY | Facility: CLINIC | Age: 57
End: 2017-10-04
Payer: COMMERCIAL

## 2017-10-04 VITALS
HEART RATE: 98 BPM | WEIGHT: 230 LBS | RESPIRATION RATE: 16 BRPM | TEMPERATURE: 98.8 F | HEIGHT: 69 IN | BODY MASS INDEX: 34.07 KG/M2 | OXYGEN SATURATION: 97 % | SYSTOLIC BLOOD PRESSURE: 133 MMHG | DIASTOLIC BLOOD PRESSURE: 88 MMHG

## 2017-10-04 PROCEDURE — 99024 POSTOP FOLLOW-UP VISIT: CPT

## 2017-10-04 RX ORDER — LANCETS 28 GAUGE
EACH MISCELLANEOUS
Qty: 100 | Refills: 0 | Status: ACTIVE | COMMUNITY
Start: 2017-09-26

## 2017-10-05 ENCOUNTER — APPOINTMENT (OUTPATIENT)
Dept: SURGICAL ONCOLOGY | Facility: CLINIC | Age: 57
End: 2017-10-05

## 2017-10-11 ENCOUNTER — APPOINTMENT (OUTPATIENT)
Dept: PLASTIC SURGERY | Facility: CLINIC | Age: 57
End: 2017-10-11
Payer: COMMERCIAL

## 2017-10-11 VITALS
DIASTOLIC BLOOD PRESSURE: 74 MMHG | BODY MASS INDEX: 34.07 KG/M2 | HEIGHT: 69 IN | WEIGHT: 230 LBS | HEART RATE: 87 BPM | RESPIRATION RATE: 16 BRPM | OXYGEN SATURATION: 100 % | SYSTOLIC BLOOD PRESSURE: 138 MMHG

## 2017-10-11 PROCEDURE — 99024 POSTOP FOLLOW-UP VISIT: CPT

## 2017-10-18 ENCOUNTER — APPOINTMENT (OUTPATIENT)
Dept: PLASTIC SURGERY | Facility: CLINIC | Age: 57
End: 2017-10-18
Payer: COMMERCIAL

## 2017-10-18 VITALS
RESPIRATION RATE: 16 BRPM | BODY MASS INDEX: 34.07 KG/M2 | DIASTOLIC BLOOD PRESSURE: 86 MMHG | WEIGHT: 230 LBS | OXYGEN SATURATION: 99 % | HEART RATE: 94 BPM | SYSTOLIC BLOOD PRESSURE: 136 MMHG | HEIGHT: 69 IN

## 2017-10-18 PROCEDURE — 99024 POSTOP FOLLOW-UP VISIT: CPT

## 2017-10-19 RX ORDER — ROSUVASTATIN CALCIUM 20 MG/1
20 TABLET, FILM COATED ORAL
Qty: 30 | Refills: 0 | Status: ACTIVE | COMMUNITY
Start: 2017-10-02

## 2017-11-01 ENCOUNTER — APPOINTMENT (OUTPATIENT)
Dept: PLASTIC SURGERY | Facility: CLINIC | Age: 57
End: 2017-11-01
Payer: COMMERCIAL

## 2017-11-01 VITALS
SYSTOLIC BLOOD PRESSURE: 135 MMHG | BODY MASS INDEX: 34.07 KG/M2 | HEART RATE: 92 BPM | OXYGEN SATURATION: 98 % | HEIGHT: 69 IN | WEIGHT: 230 LBS | RESPIRATION RATE: 16 BRPM | DIASTOLIC BLOOD PRESSURE: 83 MMHG

## 2017-11-01 PROCEDURE — 99024 POSTOP FOLLOW-UP VISIT: CPT

## 2017-11-08 ENCOUNTER — APPOINTMENT (OUTPATIENT)
Dept: PLASTIC SURGERY | Facility: CLINIC | Age: 57
End: 2017-11-08
Payer: COMMERCIAL

## 2017-11-08 VITALS
HEIGHT: 69 IN | BODY MASS INDEX: 34.07 KG/M2 | TEMPERATURE: 98.1 F | WEIGHT: 230 LBS | HEART RATE: 103 BPM | OXYGEN SATURATION: 100 % | DIASTOLIC BLOOD PRESSURE: 82 MMHG | SYSTOLIC BLOOD PRESSURE: 124 MMHG | RESPIRATION RATE: 15 BRPM

## 2017-11-08 PROCEDURE — 99024 POSTOP FOLLOW-UP VISIT: CPT

## 2017-11-15 ENCOUNTER — APPOINTMENT (OUTPATIENT)
Dept: PLASTIC SURGERY | Facility: CLINIC | Age: 57
End: 2017-11-15
Payer: COMMERCIAL

## 2017-11-15 PROCEDURE — 99024 POSTOP FOLLOW-UP VISIT: CPT

## 2017-11-22 ENCOUNTER — APPOINTMENT (OUTPATIENT)
Dept: PLASTIC SURGERY | Facility: CLINIC | Age: 57
End: 2017-11-22

## 2017-11-22 ENCOUNTER — APPOINTMENT (OUTPATIENT)
Dept: PLASTIC SURGERY | Facility: CLINIC | Age: 57
End: 2017-11-22
Payer: COMMERCIAL

## 2017-11-22 PROCEDURE — 99212 OFFICE O/P EST SF 10 MIN: CPT

## 2017-11-24 RX ORDER — BLOOD-GLUCOSE METER
KIT MISCELLANEOUS
Qty: 1 | Refills: 0 | Status: ACTIVE | COMMUNITY
Start: 2017-09-26

## 2017-11-24 RX ORDER — ASPIRIN 81 MG
81 TABLET,CHEWABLE ORAL
Qty: 30 | Refills: 0 | Status: ACTIVE | COMMUNITY
Start: 2017-09-26

## 2017-11-29 ENCOUNTER — APPOINTMENT (OUTPATIENT)
Dept: PLASTIC SURGERY | Facility: CLINIC | Age: 57
End: 2017-11-29
Payer: COMMERCIAL

## 2017-11-29 VITALS
HEART RATE: 107 BPM | WEIGHT: 230 LBS | DIASTOLIC BLOOD PRESSURE: 89 MMHG | SYSTOLIC BLOOD PRESSURE: 140 MMHG | RESPIRATION RATE: 16 BRPM | OXYGEN SATURATION: 100 % | BODY MASS INDEX: 34.07 KG/M2 | HEIGHT: 69 IN

## 2017-11-29 PROCEDURE — 99213 OFFICE O/P EST LOW 20 MIN: CPT

## 2018-01-25 ENCOUNTER — APPOINTMENT (OUTPATIENT)
Dept: SURGICAL ONCOLOGY | Facility: CLINIC | Age: 58
End: 2018-01-25
Payer: SELF-PAY

## 2018-01-25 VITALS
SYSTOLIC BLOOD PRESSURE: 133 MMHG | HEART RATE: 93 BPM | RESPIRATION RATE: 16 BRPM | HEIGHT: 69 IN | DIASTOLIC BLOOD PRESSURE: 84 MMHG | BODY MASS INDEX: 34.07 KG/M2 | WEIGHT: 230 LBS | OXYGEN SATURATION: 97 %

## 2018-01-25 PROCEDURE — 99214 OFFICE O/P EST MOD 30 MIN: CPT

## 2018-02-01 ENCOUNTER — APPOINTMENT (OUTPATIENT)
Dept: PHYSICAL MEDICINE AND REHAB | Facility: CLINIC | Age: 58
End: 2018-02-01
Payer: COMMERCIAL

## 2018-02-01 VITALS
DIASTOLIC BLOOD PRESSURE: 85 MMHG | BODY MASS INDEX: 34.21 KG/M2 | TEMPERATURE: 98.1 F | HEART RATE: 90 BPM | OXYGEN SATURATION: 100 % | WEIGHT: 231 LBS | SYSTOLIC BLOOD PRESSURE: 128 MMHG | HEIGHT: 69 IN

## 2018-02-01 PROCEDURE — 99203 OFFICE O/P NEW LOW 30 MIN: CPT

## 2018-02-26 ENCOUNTER — OUTPATIENT (OUTPATIENT)
Dept: OUTPATIENT SERVICES | Facility: HOSPITAL | Age: 58
LOS: 1 days | End: 2018-02-26

## 2018-02-26 VITALS
OXYGEN SATURATION: 100 % | HEIGHT: 69 IN | RESPIRATION RATE: 18 BRPM | HEART RATE: 74 BPM | WEIGHT: 238.98 LBS | TEMPERATURE: 97 F | SYSTOLIC BLOOD PRESSURE: 120 MMHG | DIASTOLIC BLOOD PRESSURE: 70 MMHG

## 2018-02-26 DIAGNOSIS — E11.9 TYPE 2 DIABETES MELLITUS WITHOUT COMPLICATIONS: ICD-10-CM

## 2018-02-26 DIAGNOSIS — D05.11 INTRADUCTAL CARCINOMA IN SITU OF RIGHT BREAST: ICD-10-CM

## 2018-02-26 DIAGNOSIS — Z01.818 ENCOUNTER FOR OTHER PREPROCEDURAL EXAMINATION: ICD-10-CM

## 2018-02-26 DIAGNOSIS — G47.33 OBSTRUCTIVE SLEEP APNEA (ADULT) (PEDIATRIC): ICD-10-CM

## 2018-02-26 DIAGNOSIS — I10 ESSENTIAL (PRIMARY) HYPERTENSION: ICD-10-CM

## 2018-02-26 DIAGNOSIS — J45.909 UNSPECIFIED ASTHMA, UNCOMPLICATED: ICD-10-CM

## 2018-02-26 DIAGNOSIS — Z90.13 ACQUIRED ABSENCE OF BILATERAL BREASTS AND NIPPLES: Chronic | ICD-10-CM

## 2018-02-26 DIAGNOSIS — E78.5 HYPERLIPIDEMIA, UNSPECIFIED: ICD-10-CM

## 2018-02-26 DIAGNOSIS — Z98.890 OTHER SPECIFIED POSTPROCEDURAL STATES: Chronic | ICD-10-CM

## 2018-02-26 LAB
ANION GAP SERPL CALC-SCNC: 16 MMOL/L — SIGNIFICANT CHANGE UP (ref 5–17)
BUN SERPL-MCNC: 10 MG/DL — SIGNIFICANT CHANGE UP (ref 7–23)
CALCIUM SERPL-MCNC: 9.8 MG/DL — SIGNIFICANT CHANGE UP (ref 8.4–10.5)
CHLORIDE SERPL-SCNC: 97 MMOL/L — SIGNIFICANT CHANGE UP (ref 96–108)
CO2 SERPL-SCNC: 25 MMOL/L — SIGNIFICANT CHANGE UP (ref 22–31)
CREAT SERPL-MCNC: 0.63 MG/DL — SIGNIFICANT CHANGE UP (ref 0.5–1.3)
GLUCOSE SERPL-MCNC: 84 MG/DL — SIGNIFICANT CHANGE UP (ref 70–99)
HBA1C BLD-MCNC: 6.5 % — HIGH (ref 4–5.6)
HCT VFR BLD CALC: 41.2 % — SIGNIFICANT CHANGE UP (ref 34.5–45)
HGB BLD-MCNC: 13.8 G/DL — SIGNIFICANT CHANGE UP (ref 11.5–15.5)
MCHC RBC-ENTMCNC: 27.4 PG — SIGNIFICANT CHANGE UP (ref 27–34)
MCHC RBC-ENTMCNC: 33.5 GM/DL — SIGNIFICANT CHANGE UP (ref 32–36)
MCV RBC AUTO: 81.7 FL — SIGNIFICANT CHANGE UP (ref 80–100)
PLATELET # BLD AUTO: 299 K/UL — SIGNIFICANT CHANGE UP (ref 150–400)
POTASSIUM SERPL-MCNC: 3.6 MMOL/L — SIGNIFICANT CHANGE UP (ref 3.5–5.3)
POTASSIUM SERPL-SCNC: 3.6 MMOL/L — SIGNIFICANT CHANGE UP (ref 3.5–5.3)
RBC # BLD: 5.04 M/UL — SIGNIFICANT CHANGE UP (ref 3.8–5.2)
RBC # FLD: 15.6 % — HIGH (ref 10.3–14.5)
SODIUM SERPL-SCNC: 138 MMOL/L — SIGNIFICANT CHANGE UP (ref 135–145)
WBC # BLD: 8.33 K/UL — SIGNIFICANT CHANGE UP (ref 3.8–10.5)
WBC # FLD AUTO: 8.33 K/UL — SIGNIFICANT CHANGE UP (ref 3.8–10.5)

## 2018-02-26 PROCEDURE — 83036 HEMOGLOBIN GLYCOSYLATED A1C: CPT

## 2018-02-26 PROCEDURE — 85027 COMPLETE CBC AUTOMATED: CPT

## 2018-02-26 PROCEDURE — G0463: CPT

## 2018-02-26 PROCEDURE — 80048 BASIC METABOLIC PNL TOTAL CA: CPT

## 2018-02-26 RX ORDER — CEFAZOLIN SODIUM 1 G
2000 VIAL (EA) INJECTION ONCE
Qty: 0 | Refills: 0 | Status: DISCONTINUED | OUTPATIENT
Start: 2018-03-05 | End: 2018-03-20

## 2018-02-26 RX ORDER — SODIUM CHLORIDE 9 MG/ML
3 INJECTION INTRAMUSCULAR; INTRAVENOUS; SUBCUTANEOUS EVERY 8 HOURS
Qty: 0 | Refills: 0 | Status: DISCONTINUED | OUTPATIENT
Start: 2018-03-05 | End: 2018-03-20

## 2018-02-26 RX ORDER — LIDOCAINE HCL 20 MG/ML
0.2 VIAL (ML) INJECTION ONCE
Qty: 0 | Refills: 0 | Status: DISCONTINUED | OUTPATIENT
Start: 2018-03-05 | End: 2018-03-20

## 2018-02-26 NOTE — H&P PST ADULT - PMH
Asthma  stable  DM (diabetes mellitus)  stable  HLD (hyperlipidemia)  no meds  HTN (hypertension)  controlled  Obesity    BHARATHI (obstructive sleep apnea)

## 2018-02-26 NOTE — H&P PST ADULT - PROBLEM SELECTOR PLAN 1
revision of reconstructed breast capsulotomies  placement of implants bilaterally  possible fat grafting  possible liposuction

## 2018-02-26 NOTE — H&P PST ADULT - HISTORY OF PRESENT ILLNESS
This is a 57 y/o female with history of right breast cancer This is a 57 y/o female with history of right breast cancer s/p mastectomy with expander 2017, she presents today for breast reconstruction. PMH includes asthma type 2 DM, HTN, hylipidemia

## 2018-02-27 PROBLEM — I10 ESSENTIAL (PRIMARY) HYPERTENSION: Chronic | Status: ACTIVE | Noted: 2017-03-20

## 2018-03-05 ENCOUNTER — OUTPATIENT (OUTPATIENT)
Dept: OUTPATIENT SERVICES | Facility: HOSPITAL | Age: 58
LOS: 1 days | End: 2018-03-05
Payer: COMMERCIAL

## 2018-03-05 ENCOUNTER — RESULT REVIEW (OUTPATIENT)
Age: 58
End: 2018-03-05

## 2018-03-05 VITALS
TEMPERATURE: 98 F | RESPIRATION RATE: 16 BRPM | OXYGEN SATURATION: 100 % | HEART RATE: 88 BPM | SYSTOLIC BLOOD PRESSURE: 105 MMHG | DIASTOLIC BLOOD PRESSURE: 67 MMHG

## 2018-03-05 VITALS
OXYGEN SATURATION: 98 % | RESPIRATION RATE: 20 BRPM | HEART RATE: 94 BPM | HEIGHT: 69 IN | DIASTOLIC BLOOD PRESSURE: 90 MMHG | SYSTOLIC BLOOD PRESSURE: 134 MMHG | TEMPERATURE: 98 F | WEIGHT: 238.98 LBS

## 2018-03-05 DIAGNOSIS — Z01.818 ENCOUNTER FOR OTHER PREPROCEDURAL EXAMINATION: ICD-10-CM

## 2018-03-05 DIAGNOSIS — Z90.13 ACQUIRED ABSENCE OF BILATERAL BREASTS AND NIPPLES: Chronic | ICD-10-CM

## 2018-03-05 DIAGNOSIS — Z98.890 OTHER SPECIFIED POSTPROCEDURAL STATES: Chronic | ICD-10-CM

## 2018-03-05 DIAGNOSIS — D05.11 INTRADUCTAL CARCINOMA IN SITU OF RIGHT BREAST: ICD-10-CM

## 2018-03-05 LAB — GLUCOSE BLDC GLUCOMTR-MCNC: 106 MG/DL — HIGH (ref 70–99)

## 2018-03-05 PROCEDURE — 19342 INSJ/RPLCMT BRST IMPLT SEP D: CPT | Mod: LT

## 2018-03-05 PROCEDURE — 19366: CPT | Mod: 59,LT

## 2018-03-05 PROCEDURE — 19370 REVJ PERI-IMPLT CAPSULE BRST: CPT | Mod: 59,LT

## 2018-03-05 PROCEDURE — 19380 REVJ RECONSTRUCTED BREAST: CPT | Mod: 50

## 2018-03-05 PROCEDURE — C1789: CPT

## 2018-03-05 PROCEDURE — 19342 INSJ/RPLCMT BRST IMPLT SEP D: CPT | Mod: 50

## 2018-03-05 PROCEDURE — 82962 GLUCOSE BLOOD TEST: CPT

## 2018-03-05 PROCEDURE — 88305 TISSUE EXAM BY PATHOLOGIST: CPT

## 2018-03-05 PROCEDURE — 88305 TISSUE EXAM BY PATHOLOGIST: CPT | Mod: 26

## 2018-03-05 PROCEDURE — 15877 SUCTION LIPECTOMY TRUNK: CPT

## 2018-03-05 RX ORDER — CELECOXIB 200 MG/1
200 CAPSULE ORAL ONCE
Qty: 0 | Refills: 0 | Status: DISCONTINUED | OUTPATIENT
Start: 2018-03-05 | End: 2018-03-20

## 2018-03-05 RX ORDER — ONDANSETRON 8 MG/1
4 TABLET, FILM COATED ORAL ONCE
Qty: 0 | Refills: 0 | Status: DISCONTINUED | OUTPATIENT
Start: 2018-03-05 | End: 2018-03-20

## 2018-03-05 RX ORDER — OXYCODONE HYDROCHLORIDE 5 MG/1
5 TABLET ORAL ONCE
Qty: 0 | Refills: 0 | Status: DISCONTINUED | OUTPATIENT
Start: 2018-03-05 | End: 2018-03-05

## 2018-03-05 RX ORDER — SODIUM CHLORIDE 9 MG/ML
1000 INJECTION, SOLUTION INTRAVENOUS
Qty: 0 | Refills: 0 | Status: DISCONTINUED | OUTPATIENT
Start: 2018-03-05 | End: 2018-03-20

## 2018-03-05 RX ORDER — ACETAMINOPHEN 500 MG
1000 TABLET ORAL ONCE
Qty: 0 | Refills: 0 | Status: COMPLETED | OUTPATIENT
Start: 2018-03-05 | End: 2018-03-05

## 2018-03-05 RX ORDER — OXYCODONE HYDROCHLORIDE 5 MG/1
10 TABLET ORAL ONCE
Qty: 0 | Refills: 0 | Status: DISCONTINUED | OUTPATIENT
Start: 2018-03-05 | End: 2018-03-05

## 2018-03-05 RX ORDER — CELECOXIB 200 MG/1
200 CAPSULE ORAL ONCE
Qty: 0 | Refills: 0 | Status: COMPLETED | OUTPATIENT
Start: 2018-03-05 | End: 2018-03-05

## 2018-03-05 RX ORDER — APREPITANT 80 MG/1
40 CAPSULE ORAL ONCE
Qty: 0 | Refills: 0 | Status: COMPLETED | OUTPATIENT
Start: 2018-03-05 | End: 2018-03-05

## 2018-03-05 RX ORDER — CEPHALEXIN 500 MG
1 CAPSULE ORAL
Qty: 14 | Refills: 0
Start: 2018-03-05 | End: 2018-03-11

## 2018-03-05 RX ORDER — MORPHINE SULFATE 50 MG/1
2 CAPSULE, EXTENDED RELEASE ORAL
Qty: 0 | Refills: 0 | Status: DISCONTINUED | OUTPATIENT
Start: 2018-03-05 | End: 2018-03-05

## 2018-03-05 RX ADMIN — Medication 1000 MILLIGRAM(S): at 07:28

## 2018-03-05 RX ADMIN — CELECOXIB 200 MILLIGRAM(S): 200 CAPSULE ORAL at 07:28

## 2018-03-05 RX ADMIN — APREPITANT 40 MILLIGRAM(S): 80 CAPSULE ORAL at 07:28

## 2018-03-05 NOTE — ASU DISCHARGE PLAN (ADULT/PEDIATRIC). - MEDICATION SUMMARY - MEDICATIONS TO TAKE
I will START or STAY ON the medications listed below when I get home from the hospital:    Aspir 81 oral delayed release tablet  -- 1 tab(s) by mouth once a day  -- Indication: For home med    Percocet 5/325 oral tablet  -- 1 tab(s) by mouth every 4 to 6 hours, As Needed -for severe pain MDD:6 tabs   -- Caution federal law prohibits the transfer of this drug to any person other  than the person for whom it was prescribed.  May cause drowsiness.  Alcohol may intensify this effect.  Use care when operating dangerous machinery.  This prescription cannot be refilled.  This product contains acetaminophen.  Do not use  with any other product containing acetaminophen to prevent possible liver damage.  Using more of this medication than prescribed may cause serious breathing problems.    -- Indication: For for severe pain    metFORMIN 500 mg oral tablet  -- 1 tab(s) by mouth 2 times a day  -- Indication: For home med    Crestor 20 mg oral tablet  -- 1 tab(s) by mouth once a day (at bedtime)  -- Indication: For home med    olmesartan-hydroCHLOROthiazide 40mg-25mg oral tablet  -- 1 tab(s) by mouth once a day  -- Indication: For home med    ProAir HFA 90 mcg/inh inhalation aerosol  -- 2 puff(s) inhaled 4 times a day, As Needed  -- Indication: For home med    Advair Diskus 250 mcg-50 mcg inhalation powder  -- 1 puff(s) inhaled 2 times a day  -- Indication: For home med    NIFEdipine 30 mg oral tablet, extended release  -- 1 tab(s) by mouth once a day  -- Indication: For home med    Keflex 500 mg oral capsule  -- 1 cap(s) by mouth every 12 hours MDD:2 tabs   -- Finish all this medication unless otherwise directed by prescriber.    -- Indication: For prophylactic s/p surgery

## 2018-03-05 NOTE — BRIEF OPERATIVE NOTE - OPERATION/FINDINGS
Capsulotomies were performed bilaterally and tissue expander devices were identified. The tissue expanders were removed and implants placed. Fat was harvested from the abdomen and grafted to both reconstructed breasts. Axillary area also liposuctioned.

## 2018-03-05 NOTE — ASU DISCHARGE PLAN (ADULT/PEDIATRIC). - NOTIFY
Bleeding that does not stop/Swelling that continues/Pain not relieved by Medications/Numbness, color, or temperature change to extremity/Fever greater than 101

## 2018-03-05 NOTE — PRE-ANESTHESIA EVALUATION ADULT - NSANTHPMHFT_GEN_ALL_CORE
Asthma under control with medication PRN. last time used meds 2 days ago.  Glu FS   BHARATHI not using CPAP, not needed as per patient

## 2018-03-05 NOTE — ASU PATIENT PROFILE, ADULT - VISION (WITH CORRECTIVE LENSES IF THE PATIENT USUALLY WEARS THEM):
Normal vision: sees adequately in most situations; can see medication labels, newsprint Reading glasses left at home/Partially impaired: cannot see medication labels or newsprint, but can see obstacles in path, and the surrounding layout; can count fingers at arm's length

## 2018-03-05 NOTE — PRE-ANESTHESIA EVALUATION ADULT - NSANTHADDINFOFT_GEN_ALL_CORE
Has a positive ST in April 2017. Had 5 hours GETA for Mastectomy and expander Aug 2017 without any problems.   Has a follow up note Febr 2018 from cardiology, no further testing requested. Cardiologist aware of the surgery as per patient

## 2018-03-05 NOTE — BRIEF OPERATIVE NOTE - PROCEDURE
<<-----Click on this checkbox to enter Procedure Revision of reconstruction of breast with insertion of breast implant  03/05/2018  with autologous fat grafting  Active  DIALLO

## 2018-03-06 ENCOUNTER — TRANSCRIPTION ENCOUNTER (OUTPATIENT)
Age: 58
End: 2018-03-06

## 2018-03-09 ENCOUNTER — APPOINTMENT (OUTPATIENT)
Dept: PLASTIC SURGERY | Facility: CLINIC | Age: 58
End: 2018-03-09
Payer: COMMERCIAL

## 2018-03-09 PROCEDURE — 99024 POSTOP FOLLOW-UP VISIT: CPT

## 2018-03-16 ENCOUNTER — APPOINTMENT (OUTPATIENT)
Dept: PLASTIC SURGERY | Facility: CLINIC | Age: 58
End: 2018-03-16
Payer: COMMERCIAL

## 2018-03-16 VITALS — DIASTOLIC BLOOD PRESSURE: 68 MMHG | SYSTOLIC BLOOD PRESSURE: 96 MMHG | HEART RATE: 90 BPM

## 2018-03-16 PROCEDURE — 99024 POSTOP FOLLOW-UP VISIT: CPT

## 2018-03-30 ENCOUNTER — APPOINTMENT (OUTPATIENT)
Dept: PLASTIC SURGERY | Facility: CLINIC | Age: 58
End: 2018-03-30

## 2018-04-05 ENCOUNTER — OTHER (OUTPATIENT)
Age: 58
End: 2018-04-05

## 2018-04-17 ENCOUNTER — FORM ENCOUNTER (OUTPATIENT)
Age: 58
End: 2018-04-17

## 2018-04-18 ENCOUNTER — APPOINTMENT (OUTPATIENT)
Dept: RADIOLOGY | Facility: IMAGING CENTER | Age: 58
End: 2018-04-18

## 2018-04-18 ENCOUNTER — OUTPATIENT (OUTPATIENT)
Dept: OUTPATIENT SERVICES | Facility: HOSPITAL | Age: 58
LOS: 1 days | End: 2018-04-18
Payer: COMMERCIAL

## 2018-04-18 ENCOUNTER — APPOINTMENT (OUTPATIENT)
Dept: PHYSICAL MEDICINE AND REHAB | Facility: CLINIC | Age: 58
End: 2018-04-18
Payer: COMMERCIAL

## 2018-04-18 DIAGNOSIS — Z98.890 OTHER SPECIFIED POSTPROCEDURAL STATES: Chronic | ICD-10-CM

## 2018-04-18 DIAGNOSIS — Z90.13 ACQUIRED ABSENCE OF BILATERAL BREASTS AND NIPPLES: Chronic | ICD-10-CM

## 2018-04-18 DIAGNOSIS — G89.18 OTHER ACUTE POSTPROCEDURAL PAIN: ICD-10-CM

## 2018-04-18 PROCEDURE — 73562 X-RAY EXAM OF KNEE 3: CPT

## 2018-04-18 PROCEDURE — 73562 X-RAY EXAM OF KNEE 3: CPT | Mod: 26,LT,RT

## 2018-04-18 PROCEDURE — 99213 OFFICE O/P EST LOW 20 MIN: CPT

## 2018-04-20 ENCOUNTER — APPOINTMENT (OUTPATIENT)
Dept: PLASTIC SURGERY | Facility: CLINIC | Age: 58
End: 2018-04-20

## 2018-06-01 ENCOUNTER — APPOINTMENT (OUTPATIENT)
Dept: PLASTIC SURGERY | Facility: CLINIC | Age: 58
End: 2018-06-01
Payer: COMMERCIAL

## 2018-06-01 PROCEDURE — XXXXX: CPT

## 2018-06-07 ENCOUNTER — APPOINTMENT (OUTPATIENT)
Dept: PHYSICAL MEDICINE AND REHAB | Facility: CLINIC | Age: 58
End: 2018-06-07
Payer: COMMERCIAL

## 2018-06-07 VITALS
TEMPERATURE: 97.8 F | HEART RATE: 82 BPM | OXYGEN SATURATION: 100 % | SYSTOLIC BLOOD PRESSURE: 137 MMHG | DIASTOLIC BLOOD PRESSURE: 90 MMHG

## 2018-06-07 PROCEDURE — 20610 DRAIN/INJ JOINT/BURSA W/O US: CPT | Mod: RT

## 2018-06-07 PROCEDURE — 99213 OFFICE O/P EST LOW 20 MIN: CPT | Mod: 25

## 2018-06-21 ENCOUNTER — APPOINTMENT (OUTPATIENT)
Dept: SURGICAL ONCOLOGY | Facility: CLINIC | Age: 58
End: 2018-06-21
Payer: COMMERCIAL

## 2018-06-21 VITALS
OXYGEN SATURATION: 99 % | DIASTOLIC BLOOD PRESSURE: 84 MMHG | HEIGHT: 69 IN | SYSTOLIC BLOOD PRESSURE: 124 MMHG | BODY MASS INDEX: 34.51 KG/M2 | HEART RATE: 75 BPM | WEIGHT: 233 LBS

## 2018-06-21 DIAGNOSIS — Z00.00 ENCOUNTER FOR GENERAL ADULT MEDICAL EXAMINATION W/OUT ABNORMAL FINDINGS: ICD-10-CM

## 2018-06-21 PROCEDURE — 99214 OFFICE O/P EST MOD 30 MIN: CPT

## 2018-07-10 ENCOUNTER — OTHER (OUTPATIENT)
Age: 58
End: 2018-07-10

## 2018-07-13 ENCOUNTER — APPOINTMENT (OUTPATIENT)
Dept: PLASTIC SURGERY | Facility: CLINIC | Age: 58
End: 2018-07-13
Payer: COMMERCIAL

## 2018-07-13 DIAGNOSIS — N64.4 MASTODYNIA: ICD-10-CM

## 2018-07-13 PROCEDURE — 99212 OFFICE O/P EST SF 10 MIN: CPT

## 2018-07-16 PROBLEM — E78.5 HYPERLIPIDEMIA, UNSPECIFIED: Chronic | Status: ACTIVE | Noted: 2017-05-18

## 2018-07-16 PROBLEM — J45.909 UNSPECIFIED ASTHMA, UNCOMPLICATED: Chronic | Status: ACTIVE | Noted: 2017-03-20

## 2018-07-16 PROBLEM — E11.9 TYPE 2 DIABETES MELLITUS WITHOUT COMPLICATIONS: Chronic | Status: ACTIVE | Noted: 2017-05-18

## 2018-07-17 ENCOUNTER — OUTPATIENT (OUTPATIENT)
Dept: OUTPATIENT SERVICES | Facility: HOSPITAL | Age: 58
LOS: 1 days | End: 2018-07-17
Payer: COMMERCIAL

## 2018-07-17 ENCOUNTER — APPOINTMENT (OUTPATIENT)
Dept: ULTRASOUND IMAGING | Facility: IMAGING CENTER | Age: 58
End: 2018-07-17

## 2018-07-17 DIAGNOSIS — Z85.3 PERSONAL HISTORY OF MALIGNANT NEOPLASM OF BREAST: ICD-10-CM

## 2018-07-17 DIAGNOSIS — Z90.13 ACQUIRED ABSENCE OF BILATERAL BREASTS AND NIPPLES: Chronic | ICD-10-CM

## 2018-07-17 DIAGNOSIS — N64.4 MASTODYNIA: ICD-10-CM

## 2018-07-17 DIAGNOSIS — Z98.890 OTHER SPECIFIED POSTPROCEDURAL STATES: Chronic | ICD-10-CM

## 2018-07-17 PROBLEM — E66.9 OBESITY, UNSPECIFIED: Chronic | Status: ACTIVE | Noted: 2017-08-03

## 2018-07-17 PROBLEM — G47.33 OBSTRUCTIVE SLEEP APNEA (ADULT) (PEDIATRIC): Chronic | Status: ACTIVE | Noted: 2018-02-26

## 2018-07-17 PROCEDURE — 76641 ULTRASOUND BREAST COMPLETE: CPT

## 2018-07-17 PROCEDURE — 76641 ULTRASOUND BREAST COMPLETE: CPT | Mod: 26,RT

## 2018-08-10 ENCOUNTER — APPOINTMENT (OUTPATIENT)
Dept: PLASTIC SURGERY | Facility: CLINIC | Age: 58
End: 2018-08-10
Payer: COMMERCIAL

## 2018-08-10 PROCEDURE — 99213 OFFICE O/P EST LOW 20 MIN: CPT

## 2018-09-27 ENCOUNTER — APPOINTMENT (OUTPATIENT)
Dept: PHYSICAL MEDICINE AND REHAB | Facility: CLINIC | Age: 58
End: 2018-09-27
Payer: COMMERCIAL

## 2018-09-27 VITALS — HEART RATE: 70 BPM | DIASTOLIC BLOOD PRESSURE: 84 MMHG | SYSTOLIC BLOOD PRESSURE: 128 MMHG

## 2018-09-27 PROCEDURE — 99213 OFFICE O/P EST LOW 20 MIN: CPT

## 2018-11-05 ENCOUNTER — APPOINTMENT (OUTPATIENT)
Dept: SURGICAL ONCOLOGY | Facility: CLINIC | Age: 58
End: 2018-11-05
Payer: COMMERCIAL

## 2018-11-05 VITALS
WEIGHT: 236 LBS | HEIGHT: 69 IN | SYSTOLIC BLOOD PRESSURE: 138 MMHG | DIASTOLIC BLOOD PRESSURE: 79 MMHG | RESPIRATION RATE: 15 BRPM | OXYGEN SATURATION: 8 % | BODY MASS INDEX: 34.96 KG/M2 | HEART RATE: 99 BPM

## 2018-11-05 DIAGNOSIS — G89.18 OTHER ACUTE POSTPROCEDURAL PAIN: ICD-10-CM

## 2018-11-05 PROCEDURE — 99214 OFFICE O/P EST MOD 30 MIN: CPT

## 2018-11-25 ENCOUNTER — FORM ENCOUNTER (OUTPATIENT)
Age: 58
End: 2018-11-25

## 2018-11-26 ENCOUNTER — OUTPATIENT (OUTPATIENT)
Dept: OUTPATIENT SERVICES | Facility: HOSPITAL | Age: 58
LOS: 1 days | End: 2018-11-26
Payer: COMMERCIAL

## 2018-11-26 ENCOUNTER — APPOINTMENT (OUTPATIENT)
Dept: MRI IMAGING | Facility: IMAGING CENTER | Age: 58
End: 2018-11-26
Payer: COMMERCIAL

## 2018-11-26 DIAGNOSIS — Z85.3 PERSONAL HISTORY OF MALIGNANT NEOPLASM OF BREAST: ICD-10-CM

## 2018-11-26 DIAGNOSIS — Z90.13 ACQUIRED ABSENCE OF BILATERAL BREASTS AND NIPPLES: Chronic | ICD-10-CM

## 2018-11-26 DIAGNOSIS — D05.11 INTRADUCTAL CARCINOMA IN SITU OF RIGHT BREAST: ICD-10-CM

## 2018-11-26 DIAGNOSIS — Z98.890 OTHER SPECIFIED POSTPROCEDURAL STATES: Chronic | ICD-10-CM

## 2018-11-26 DIAGNOSIS — G89.18 OTHER ACUTE POSTPROCEDURAL PAIN: ICD-10-CM

## 2018-11-26 PROCEDURE — 77059 MRI BREAST BILATERAL: CPT | Mod: 26

## 2018-11-26 PROCEDURE — 77047 MRI BREAST C- BILATERAL: CPT

## 2018-11-30 ENCOUNTER — APPOINTMENT (OUTPATIENT)
Dept: PHYSICAL MEDICINE AND REHAB | Facility: CLINIC | Age: 58
End: 2018-11-30
Payer: COMMERCIAL

## 2018-11-30 VITALS
TEMPERATURE: 97.9 F | OXYGEN SATURATION: 98 % | HEART RATE: 90 BPM | DIASTOLIC BLOOD PRESSURE: 81 MMHG | SYSTOLIC BLOOD PRESSURE: 122 MMHG

## 2018-11-30 DIAGNOSIS — R20.0 ANESTHESIA OF SKIN: ICD-10-CM

## 2018-11-30 PROCEDURE — 99213 OFFICE O/P EST LOW 20 MIN: CPT

## 2018-12-21 ENCOUNTER — APPOINTMENT (OUTPATIENT)
Dept: ORTHOPEDIC SURGERY | Facility: CLINIC | Age: 58
End: 2018-12-21
Payer: COMMERCIAL

## 2018-12-21 VITALS
BODY MASS INDEX: 34.8 KG/M2 | HEART RATE: 90 BPM | SYSTOLIC BLOOD PRESSURE: 122 MMHG | HEIGHT: 69 IN | DIASTOLIC BLOOD PRESSURE: 85 MMHG | WEIGHT: 235 LBS

## 2018-12-21 DIAGNOSIS — M17.11 UNILATERAL PRIMARY OSTEOARTHRITIS, RIGHT KNEE: ICD-10-CM

## 2018-12-21 DIAGNOSIS — M67.911 UNSPECIFIED DISORDER OF SYNOVIUM AND TENDON, RIGHT SHOULDER: ICD-10-CM

## 2018-12-21 PROCEDURE — 99203 OFFICE O/P NEW LOW 30 MIN: CPT

## 2018-12-21 PROCEDURE — 73030 X-RAY EXAM OF SHOULDER: CPT | Mod: RT

## 2019-01-06 ENCOUNTER — FORM ENCOUNTER (OUTPATIENT)
Age: 59
End: 2019-01-06

## 2019-01-07 ENCOUNTER — OUTPATIENT (OUTPATIENT)
Dept: OUTPATIENT SERVICES | Facility: HOSPITAL | Age: 59
LOS: 1 days | End: 2019-01-07
Payer: COMMERCIAL

## 2019-01-07 ENCOUNTER — APPOINTMENT (OUTPATIENT)
Dept: MRI IMAGING | Facility: CLINIC | Age: 59
End: 2019-01-07

## 2019-01-07 DIAGNOSIS — Z00.8 ENCOUNTER FOR OTHER GENERAL EXAMINATION: ICD-10-CM

## 2019-01-07 DIAGNOSIS — Z98.890 OTHER SPECIFIED POSTPROCEDURAL STATES: Chronic | ICD-10-CM

## 2019-01-07 DIAGNOSIS — Z90.13 ACQUIRED ABSENCE OF BILATERAL BREASTS AND NIPPLES: Chronic | ICD-10-CM

## 2019-01-07 PROCEDURE — 72148 MRI LUMBAR SPINE W/O DYE: CPT

## 2019-01-07 PROCEDURE — 72146 MRI CHEST SPINE W/O DYE: CPT | Mod: 26

## 2019-01-07 PROCEDURE — 72148 MRI LUMBAR SPINE W/O DYE: CPT | Mod: 26

## 2019-01-07 PROCEDURE — 72146 MRI CHEST SPINE W/O DYE: CPT

## 2019-01-31 ENCOUNTER — EMERGENCY (EMERGENCY)
Facility: HOSPITAL | Age: 59
LOS: 0 days | Discharge: HOME | End: 2019-01-31
Attending: EMERGENCY MEDICINE
Payer: MEDICAID

## 2019-01-31 ENCOUNTER — APPOINTMENT (OUTPATIENT)
Dept: PHYSICAL MEDICINE AND REHAB | Facility: CLINIC | Age: 59
End: 2019-01-31
Payer: MEDICAID

## 2019-01-31 VITALS
OXYGEN SATURATION: 100 % | TEMPERATURE: 98 F | HEART RATE: 82 BPM | DIASTOLIC BLOOD PRESSURE: 78 MMHG | SYSTOLIC BLOOD PRESSURE: 117 MMHG | RESPIRATION RATE: 18 BRPM

## 2019-01-31 VITALS
SYSTOLIC BLOOD PRESSURE: 110 MMHG | DIASTOLIC BLOOD PRESSURE: 64 MMHG | HEART RATE: 98 BPM | OXYGEN SATURATION: 99 % | TEMPERATURE: 97.7 F

## 2019-01-31 VITALS
OXYGEN SATURATION: 100 % | RESPIRATION RATE: 18 BRPM | DIASTOLIC BLOOD PRESSURE: 76 MMHG | WEIGHT: 235.01 LBS | SYSTOLIC BLOOD PRESSURE: 128 MMHG | TEMPERATURE: 98 F | HEIGHT: 69 IN | HEART RATE: 95 BPM

## 2019-01-31 DIAGNOSIS — Z98.890 OTHER SPECIFIED POSTPROCEDURAL STATES: Chronic | ICD-10-CM

## 2019-01-31 DIAGNOSIS — R55 SYNCOPE AND COLLAPSE: ICD-10-CM

## 2019-01-31 DIAGNOSIS — Z79.82 LONG TERM (CURRENT) USE OF ASPIRIN: ICD-10-CM

## 2019-01-31 DIAGNOSIS — J45.909 UNSPECIFIED ASTHMA, UNCOMPLICATED: ICD-10-CM

## 2019-01-31 DIAGNOSIS — E66.9 OBESITY, UNSPECIFIED: ICD-10-CM

## 2019-01-31 DIAGNOSIS — G47.33 OBSTRUCTIVE SLEEP APNEA (ADULT) (PEDIATRIC): ICD-10-CM

## 2019-01-31 DIAGNOSIS — R05 COUGH: ICD-10-CM

## 2019-01-31 DIAGNOSIS — Z90.13 ACQUIRED ABSENCE OF BILATERAL BREASTS AND NIPPLES: ICD-10-CM

## 2019-01-31 DIAGNOSIS — Z90.13 ACQUIRED ABSENCE OF BILATERAL BREASTS AND NIPPLES: Chronic | ICD-10-CM

## 2019-01-31 DIAGNOSIS — Z79.51 LONG TERM (CURRENT) USE OF INHALED STEROIDS: ICD-10-CM

## 2019-01-31 DIAGNOSIS — E11.9 TYPE 2 DIABETES MELLITUS WITHOUT COMPLICATIONS: ICD-10-CM

## 2019-01-31 DIAGNOSIS — I10 ESSENTIAL (PRIMARY) HYPERTENSION: ICD-10-CM

## 2019-01-31 DIAGNOSIS — R42 DIZZINESS AND GIDDINESS: ICD-10-CM

## 2019-01-31 DIAGNOSIS — E78.5 HYPERLIPIDEMIA, UNSPECIFIED: ICD-10-CM

## 2019-01-31 LAB
ALBUMIN SERPL ELPH-MCNC: 3.4 G/DL — SIGNIFICANT CHANGE UP (ref 3.3–5)
ALP SERPL-CCNC: 78 U/L — SIGNIFICANT CHANGE UP (ref 40–120)
ALT FLD-CCNC: 24 U/L — SIGNIFICANT CHANGE UP (ref 12–78)
ANION GAP SERPL CALC-SCNC: 8 MMOL/L — SIGNIFICANT CHANGE UP (ref 5–17)
AST SERPL-CCNC: 18 U/L — SIGNIFICANT CHANGE UP (ref 15–37)
BASOPHILS # BLD AUTO: 0.04 K/UL — SIGNIFICANT CHANGE UP (ref 0–0.2)
BASOPHILS NFR BLD AUTO: 0.4 % — SIGNIFICANT CHANGE UP (ref 0–2)
BILIRUB SERPL-MCNC: 0.2 MG/DL — SIGNIFICANT CHANGE UP (ref 0.2–1.2)
BUN SERPL-MCNC: 14 MG/DL — SIGNIFICANT CHANGE UP (ref 7–23)
CALCIUM SERPL-MCNC: 8.7 MG/DL — SIGNIFICANT CHANGE UP (ref 8.5–10.1)
CHLORIDE SERPL-SCNC: 104 MMOL/L — SIGNIFICANT CHANGE UP (ref 96–108)
CK MB CFR SERPL CALC: 1 NG/ML — SIGNIFICANT CHANGE UP (ref 0.5–3.6)
CO2 SERPL-SCNC: 26 MMOL/L — SIGNIFICANT CHANGE UP (ref 22–31)
CREAT SERPL-MCNC: 0.7 MG/DL — SIGNIFICANT CHANGE UP (ref 0.5–1.3)
EOSINOPHIL # BLD AUTO: 0.09 K/UL — SIGNIFICANT CHANGE UP (ref 0–0.5)
EOSINOPHIL NFR BLD AUTO: 0.9 % — SIGNIFICANT CHANGE UP (ref 0–6)
GLUCOSE SERPL-MCNC: 95 MG/DL — SIGNIFICANT CHANGE UP (ref 70–99)
HCT VFR BLD CALC: 40.4 % — SIGNIFICANT CHANGE UP (ref 34.5–45)
HGB BLD-MCNC: 13.2 G/DL — SIGNIFICANT CHANGE UP (ref 11.5–15.5)
IMM GRANULOCYTES NFR BLD AUTO: 0.4 % — SIGNIFICANT CHANGE UP (ref 0–1.5)
LYMPHOCYTES # BLD AUTO: 3.96 K/UL — HIGH (ref 1–3.3)
LYMPHOCYTES # BLD AUTO: 40 % — SIGNIFICANT CHANGE UP (ref 13–44)
MAGNESIUM SERPL-MCNC: 2.7 MG/DL — HIGH (ref 1.6–2.6)
MCHC RBC-ENTMCNC: 26.9 PG — LOW (ref 27–34)
MCHC RBC-ENTMCNC: 32.7 GM/DL — SIGNIFICANT CHANGE UP (ref 32–36)
MCV RBC AUTO: 82.4 FL — SIGNIFICANT CHANGE UP (ref 80–100)
MONOCYTES # BLD AUTO: 1.01 K/UL — HIGH (ref 0–0.9)
MONOCYTES NFR BLD AUTO: 10.2 % — SIGNIFICANT CHANGE UP (ref 2–14)
NEUTROPHILS # BLD AUTO: 4.76 K/UL — SIGNIFICANT CHANGE UP (ref 1.8–7.4)
NEUTROPHILS NFR BLD AUTO: 48.1 % — SIGNIFICANT CHANGE UP (ref 43–77)
NRBC # BLD: 0 /100 WBCS — SIGNIFICANT CHANGE UP (ref 0–0)
PLATELET # BLD AUTO: 283 K/UL — SIGNIFICANT CHANGE UP (ref 150–400)
POTASSIUM SERPL-MCNC: 3.5 MMOL/L — SIGNIFICANT CHANGE UP (ref 3.5–5.3)
POTASSIUM SERPL-SCNC: 3.5 MMOL/L — SIGNIFICANT CHANGE UP (ref 3.5–5.3)
PROT SERPL-MCNC: 7.7 GM/DL — SIGNIFICANT CHANGE UP (ref 6–8.3)
RBC # BLD: 4.9 M/UL — SIGNIFICANT CHANGE UP (ref 3.8–5.2)
RBC # FLD: 14.6 % — HIGH (ref 10.3–14.5)
SODIUM SERPL-SCNC: 138 MMOL/L — SIGNIFICANT CHANGE UP (ref 135–145)
TROPONIN I SERPL-MCNC: <.015 NG/ML — SIGNIFICANT CHANGE UP (ref 0.01–0.04)
WBC # BLD: 9.9 K/UL — SIGNIFICANT CHANGE UP (ref 3.8–10.5)
WBC # FLD AUTO: 9.9 K/UL — SIGNIFICANT CHANGE UP (ref 3.8–10.5)

## 2019-01-31 PROCEDURE — 99285 EMERGENCY DEPT VISIT HI MDM: CPT

## 2019-01-31 PROCEDURE — 70450 CT HEAD/BRAIN W/O DYE: CPT | Mod: 26

## 2019-01-31 PROCEDURE — 99213 OFFICE O/P EST LOW 20 MIN: CPT

## 2019-01-31 PROCEDURE — 71045 X-RAY EXAM CHEST 1 VIEW: CPT | Mod: 26

## 2019-01-31 RX ORDER — SODIUM CHLORIDE 9 MG/ML
1000 INJECTION INTRAMUSCULAR; INTRAVENOUS; SUBCUTANEOUS ONCE
Qty: 0 | Refills: 0 | Status: COMPLETED | OUTPATIENT
Start: 2019-01-31 | End: 2019-01-31

## 2019-01-31 RX ADMIN — SODIUM CHLORIDE 1000 MILLILITER(S): 9 INJECTION INTRAMUSCULAR; INTRAVENOUS; SUBCUTANEOUS at 21:36

## 2019-01-31 NOTE — PHYSICAL EXAM
[FreeTextEntry1] : General Appearance: Well developed, well nourished, in no acute distress.\par Skin: Inspection of the skin reveals no redness. \par HEENT: The sclerae were anicteric and conjunctivae were pink and moist. \par Chest: Normal chest expansion. \par Axilla: No masses are noted. No axillary cording is noted. + pec tightness. \par Musculoskeletal: Decreased  range of motion of bilateral shoulders. + impingement tests. Decreased ROM right knee, + mild swelling \par Neurologic: The patient has normal muscle strength in bilateral upper and lower extremities.  Gait is antalgic.

## 2019-01-31 NOTE — HISTORY OF PRESENT ILLNESS
[FreeTextEntry1] : Niurka is a pleasant 58 year-old female who returns for follow up. She is status post bilateral mastectomies with tissue expanders on 8/15/17. Final pathology of the right breast was consistent with DCIS (ER+/LA+) with 10 negative right axillary lymph nodes.  She has been going to PT for  lymphedema therapy. She continues to have axillary pain, has only tried Gabapentin sporadically. \par \par Interval: Today in office, Niurka began to cough and as she reports happens frequently, got dizzy and had a fall. her vitals remained normal, she was without injury and had no LOC, no Chest pain or difficulty breathing. EMS was called and she was assessed and offered to go to ED which she declined. \par \par Her issues today: \par 1. She continues to have numbness in b/ll LE. MRI LS/TS showed lumbar spinal stenosis severe at L4/L5. Thoracic MRI also showed cervical syrinx, \par MRI-There \par is a large cervical cord syrinx extending from the level of the inferior \par endplate of C2 to the superior endplate of C5 which measures up to 0.8 cm in \par anteroposterior dimension. Cerebellar tonsils also appear low lying \par suggestive of a Chiari I malformation. Dedicated cervical spine MRI is \par recommended for further characterization. \par 2. She continues to have right shoulder pain, would like more PT

## 2019-01-31 NOTE — ED PROVIDER NOTE - PHYSICAL EXAMINATION
Gen: Alert, NAD  Head: NC, AT   Eyes: PERRL, EOMI, normal lids/conjunctiva  ENT: normal hearing, patent oropharynx without erythema/exudate, uvula midline  Neck: supple, no tenderness, Trachea midline  Pulm: Bilateral BS, normal resp effort, no wheeze/stridor/retractions  CV: RRR, no M/R/G, 2+ radial and dp pulses bl, no edema  Abd: soft, NT/ND, +BS, no hepatosplenomegaly  Mskel: extremities x4 with normal ROM and no joint effusions. no ctl spine ttp.   Skin: no rash, no bruising   Neuro: AAOx3, no sensory/motor deficits, CN 2-12 intact  Breast: mastectomy bl with mammoplasty

## 2019-01-31 NOTE — ED PROVIDER NOTE - MEDICAL DECISION MAKING DETAILS
patient pw syncope and collapse, now with ramos. will ct scan. will rule out arrythmia. will give iv fluids. possibly, coughing may have triggered a vasal reaction in her. patient pw syncope and collapse, now with ramos. will ct scan. will rule out arrythmia. will give iv fluids. possibly, coughing may have triggered a vasal reaction in her. I read ekg as nsr rate 81 with no st elevation or depression, normal qtc and pr, normal axis, no t inversions. patient pw syncope and collapse, now with ramos. will ct scan. will rule out arrythmia. will give iv fluids. possibly, coughing may have triggered a vasal reaction in her. I read ekg as nsr rate 81 with no st elevation or depression, normal qtc and pr, normal axis, no t inversions. patient is low risk by maryellen faust. given normals labs and ct head and she is feeling better, will dc home.

## 2019-01-31 NOTE — ED ADULT TRIAGE NOTE - CHIEF COMPLAINT QUOTE
c/o headache, dizziness s/p witnessed syncope x few seconds at md's office denies anticoagulation use denies n/v states hit head when episode occurred pt states ongoing issue with similar episodes in past r/t cough/bronchospasm

## 2019-01-31 NOTE — ED PROVIDER NOTE - OBJECTIVE STATEMENT
Pertinent PMH/PSH/FHx/SHx and Review of Systems contained within:  59F hx htn, dm, obesity, asthma pw syncope. patient notes she was in mds office earlier today and started a bout of coughing which made her dizzy and then she syncoped. patient notes this has happened to her several times as the coughing bouts make her dizzy. this time she hit her head. denies loc but notes ha and lightheadedness. no cp, sob, nausea, vomiting, fever, chills, rash, bleeding, numbness, dysuria.   Fh and Sh not otherwise contributory  ROS otherwise negative

## 2019-03-05 ENCOUNTER — APPOINTMENT (OUTPATIENT)
Dept: SPINE | Facility: CLINIC | Age: 59
End: 2019-03-05
Payer: MEDICAID

## 2019-03-05 VITALS
RESPIRATION RATE: 15 BRPM | BODY MASS INDEX: 35.1 KG/M2 | DIASTOLIC BLOOD PRESSURE: 70 MMHG | SYSTOLIC BLOOD PRESSURE: 108 MMHG | WEIGHT: 237 LBS | HEART RATE: 86 BPM | HEIGHT: 69 IN

## 2019-03-05 PROCEDURE — 99204 OFFICE O/P NEW MOD 45 MIN: CPT

## 2019-03-07 ENCOUNTER — APPOINTMENT (OUTPATIENT)
Dept: SURGICAL ONCOLOGY | Facility: CLINIC | Age: 59
End: 2019-03-07
Payer: MEDICAID

## 2019-03-07 VITALS
HEART RATE: 93 BPM | SYSTOLIC BLOOD PRESSURE: 123 MMHG | DIASTOLIC BLOOD PRESSURE: 80 MMHG | RESPIRATION RATE: 16 BRPM | OXYGEN SATURATION: 98 % | BODY MASS INDEX: 35.1 KG/M2 | WEIGHT: 237 LBS | HEIGHT: 69 IN

## 2019-03-07 PROCEDURE — 99214 OFFICE O/P EST MOD 30 MIN: CPT

## 2019-03-07 NOTE — CONSULT LETTER
[Dear  ___] : Dear  [unfilled], [Consult Letter:] : I had the pleasure of evaluating your patient, [unfilled]. [Please see my note below.] : Please see my note below. [Consult Closing:] : Thank you very much for allowing me to participate in the care of this patient.  If you have any questions, please do not hesitate to contact me. [Sincerely,] : Sincerely, [DrSvetlana  ___] : Dr. GIORDANO [FreeTextEntry2] : Melaine Pascual MD  [FreeTextEntry1] : Niurka is a pleasant 59 year-old female who returns for follow up.  She is status post bilateral mastectomies with tissue expanders on 8/15/17.  Final pathology of the right breast was consistent with DCIS (ER+/OH+) with 10 negative right axillary lymph nodes, 8 of which were sentinel nodes.  Final pathology of the left breast and 2 left axillary sentinel lymph nodes were benign. She is s/p revision bilateral breast reconstruction and placement of implants with Dr. Ramachandran on 3/5/18.  \par \par She was seen in the office on 11/5/18 at which point she continued to experience bilateral chest wall discomfort with referred pain to Right shoulder.  She stated that pain is becoming unbearable and she was considering having the implants removed.   She completed PT and has been using a lymphedema machine.  Since that time she continues to be followed by Dr. Espinosa from [facility] and had an orthopedic evaluation.  MRI of her spine indicated a chiari malformation as well as lumbar stenosis.  She will be evaluated by neurosurgery in the upcoming future as well.\par \par I had asked her to complete a breast MRI in November 2018, however, she became ill prior to contrast administration and the exam was terminated.  There was incidental note of skin thickening and subcutaneous stranding in the lower inner right breast. \par \par She denies any palpable subcutaneous masses involving the reconstructed breasts or chest wall bilaterally although she continues to experience intermittent pain involving the chest wall and breasts (right>left).  She will eventually pursue further revision of her reconstruction with Dr. Ramachandran. \par \par Both sides are consistent with mastectomies with implant reconstruction.  There are no subcutaneous masses or skin changes on either side. There is no axillary adenopathy on either side.\par \par Niurka will follow up in one year. [FreeTextEntry3] : Elbert Savage MD\par Surgical Oncology\par [facility]\par Office: [phone]\par Cell: [phone]

## 2019-03-07 NOTE — PHYSICAL EXAM
[Normal] : supple, no neck mass and thyroid not enlarged [Normal Supraclavicular Lymph Nodes] : normal supraclavicular lymph nodes [Normal Axillary Lymph Nodes] : normal axillary lymph nodes [Normal] : oriented to person, place and time, with appropriate affect [FreeTextEntry1] : AB present during exam  [de-identified] : s/p bilateral mastectomies with implant placement. No palpable subcutaneous nodules involving the chest wall. No axillary adenopathy on either side.

## 2019-03-07 NOTE — HISTORY OF PRESENT ILLNESS
[de-identified] : Niurka is a pleasant 59 year-old female who returns for follow up.  She is status post bilateral mastectomies with tissue expanders on 8/15/17.  Final pathology of the right breast was consistent with DCIS (ER+/OR+) with 10 negative right axillary lymph nodes, 8 of which were sentinel nodes.  Final pathology of the left breast and 2 left axillary sentinel lymph nodes were benign. She is s/p revision bilateral breast reconstruction and placement of implants with Dr. Ramachandran on 3/5/18.  \par \par She was seen in the office on 11/5/18 at which point she continued to experience bilateral chest wall discomfort with referred pain to Right shoulder.  She stated that pain is becoming unbearable and she was considering having the implants removed.   She completed PT and has been using a lymphedema machine.  Since that time she continues to be followed by Dr. Espinosa from [facility] and had an orthopedic evaluation.  MRI of her spine indicated a chiari malformation as well as lumbar stenosis.  She will be evaluated by neurosurgery in the upcoming future as well.\par \par I had asked her to complete a breast MRI in November 2018, however, she became ill prior to contrast administration and the exam was terminated.  There was incidental note of skin thickening and subcutaneous stranding in the lower inner right breast. \par \par She denies any palpable subcutaneous masses involving the reconstructed breasts or chest wall bilaterally although she continues to experience intermittent pain involving the chest wall and breasts (right>left).  She will eventually pursue further revision of her reconstruction with Dr. Ramachandran. \par \par Previous pertinent history is as follows:\par \par She was initially seen for consultation on 7/20/17.  In April 2017 she completed her annual screening bilateral mammogram and sonogram.  Mammographically, there is a large amount of microcalcifications present in the lower hemisphere of the right breast which are indeterminate (BIRADS 4).  There are probable benign left breast nodules as well for which a 6 month follow up left-sided mammogram is recommended.  She underwent a stereotactic biopsy of the right breast calcifications and final pathology demonstrated multiple foci of DCIS, intermediate to high nuclear grade, with focal central necrosis and calcifications, ER+/OR+/HER2(-).\par \par At the time of her initial presentation, she denied palpable breast mass but noted recent onset of non spontaneous bloody nipple discharge from the right breast. \par \par Her PMH is otherwise notable for HTN, DM II and mild intermittent asthma.  She has no prior issues related to her breasts. She has no family history of breast or ovarian cancer but her father had gastric cancer and her son was diagnosed with retinoblastoma.  \par

## 2019-03-11 ENCOUNTER — APPOINTMENT (OUTPATIENT)
Dept: SPINE | Facility: CLINIC | Age: 59
End: 2019-03-11
Payer: MEDICAID

## 2019-03-11 VITALS
BODY MASS INDEX: 34.66 KG/M2 | HEIGHT: 69 IN | WEIGHT: 234 LBS | DIASTOLIC BLOOD PRESSURE: 70 MMHG | SYSTOLIC BLOOD PRESSURE: 122 MMHG

## 2019-03-11 DIAGNOSIS — Z87.898 PERSONAL HISTORY OF OTHER SPECIFIED CONDITIONS: ICD-10-CM

## 2019-03-11 DIAGNOSIS — Z87.39 PERSONAL HISTORY OF OTHER DISEASES OF THE MUSCULOSKELETAL SYSTEM AND CONNECTIVE TISSUE: ICD-10-CM

## 2019-03-11 PROCEDURE — 99214 OFFICE O/P EST MOD 30 MIN: CPT

## 2019-03-11 NOTE — REASON FOR VISIT
[New Patient Visit] : a new patient visit [Referred By: _________] : Patient was referred by PASQUALE [FreeTextEntry1] : CHiari Malformation and Cervical Syrinx

## 2019-03-11 NOTE — PHYSICAL EXAM
[General Appearance - Alert] : alert [General Appearance - In No Acute Distress] : in no acute distress [Oriented To Time, Place, And Person] : oriented to person, place, and time [Impaired Insight] : insight and judgment were intact [Affect] : the affect was normal [Person] : oriented to person [Place] : oriented to place [Time] : oriented to time [Short Term Intact] : short term memory intact [Remote Intact] : remote memory intact [Span Intact] : the attention span was normal [Concentration Intact] : normal concentrating ability [Fluency] : fluency intact [Comprehension] : comprehension intact [Current Events] : adequate knowledge of current events [Past History] : adequate knowledge of personal past history [Vocabulary] : adequate range of vocabulary [Cranial Nerves Optic (II)] : visual acuity intact bilaterally,  pupils equal round and reactive to light [Cranial Nerves Oculomotor (III)] : extraocular motion intact [Cranial Nerves Trigeminal (V)] : facial sensation intact symmetrically [Cranial Nerves Facial (VII)] : face symmetrical [Cranial Nerves Vestibulocochlear (VIII)] : hearing was intact bilaterally [Cranial Nerves Glossopharyngeal (IX)] : tongue and palate midline [Cranial Nerves Accessory (XI - Cranial And Spinal)] : head turning and shoulder shrug symmetric [Cranial Nerves Hypoglossal (XII)] : there was no tongue deviation with protrusion [4] : T1 abductor digiti minimi 4/5 [5] : S1 toe walking 5/5 [Sensation Tactile Decrease] : light touch was intact [Limited Balance] : the patient's balance was impaired [2+] : Patella left 2+ [No Visual Abnormalities] : no visible abnormailities [No Tenderness to Palpation] : no spine tenderness on palpation [Full ROM] : full ROM [No Pain with ROM] : no pain with motion in any direction [Non- Antalgic] : non-antalgic [Intact] : all reflexes within normal limits bilaterally [Sclera] : the sclera and conjunctiva were normal [PERRL With Normal Accommodation] : pupils were equal in size, round, reactive to light, with normal accommodation [Extraocular Movements] : extraocular movements were intact [Outer Ear] : the ears and nose were normal in appearance [Oropharynx] : the oropharynx was normal [Neck Appearance] : the appearance of the neck was normal [Neck Cervical Mass (___cm)] : no neck mass was observed [Jugular Venous Distention Increased] : there was no jugular-venous distention [Thyroid Diffuse Enlargement] : the thyroid was not enlarged [Thyroid Nodule] : there were no palpable thyroid nodules [Auscultation Breath Sounds / Voice Sounds] : lungs were clear to auscultation bilaterally [Heart Rate And Rhythm] : heart rate was normal and rhythm regular [Heart Sounds] : normal S1 and S2 [Heart Sounds Gallop] : no gallops [Murmurs] : no murmurs [Heart Sounds Pericardial Friction Rub] : no pericardial rub [Full Pulse] : the pedal pulses are present [Edema] : there was no peripheral edema [Bowel Sounds] : normal bowel sounds [Abdomen Soft] : soft [Abdomen Tenderness] : non-tender [Abdomen Mass (___ Cm)] : no abdominal mass palpated [No CVA Tenderness] : no ~M costovertebral angle tenderness [No Spinal Tenderness] : no spinal tenderness [Abnormal Walk] : normal gait [Nail Clubbing] : no clubbing  or cyanosis of the fingernails [Musculoskeletal - Swelling] : no joint swelling seen [Motor Tone] : muscle strength and tone were normal [Skin Color & Pigmentation] : normal skin color and pigmentation [Skin Turgor] : normal skin turgor [] : no rash [Past-pointing] : there was no past-pointing [Tremor] : no tremor present [L'Hermitte's] : neck flexion did not produce tingling down the spine/arms [Spurling's - Opposite Side] : Negative Spurling's on opposite side [Spurling's Same Side] : Negative Spurling's on same side [Straight-Leg Raise Test - Left] : straight leg raise of the left leg was negative [Straight-Leg Raise Test - Right] : straight leg raise  of the right leg was negative

## 2019-03-11 NOTE — REVIEW OF SYSTEMS
[Abnormal Sensation] : an abnormal sensation [Dizziness] : dizziness [Migraine Headache] : migraine headaches [Difficulty Walking] : difficulty walking [Ataxia] : ataxia [As Noted in HPI] : as noted in HPI [Arthralgias] : arthralgias [Negative] : Heme/Lymph

## 2019-03-11 NOTE — PLAN
[FreeTextEntry1] : As the patient has radiographic evidence of crowding around the foramen magnum and C1 resulting in a syrinx, I believe that the patient may benefit from a suboccipital craniectomy, C1 laminectomy, and possible duraplasty. The risks, benefits, and alternatives to surgery were discussed with the patient, who expressed understanding, and wishes to proceed with surgery in the fall.

## 2019-03-11 NOTE — HISTORY OF PRESENT ILLNESS
[> 3 months] : more  than 3 months [FreeTextEntry1] : Headache [de-identified] : I had the pleasure of seeing Ms. Niurka Ashley for an initial visit to my office today. Ms. Ashley is a pleasant 59-year-old female who unfortunately has been suffering from headaches beginning at the back of her head and radiate forward. Her headaches are worsened by coughing. Her headaches are associated with neck pain, dizziness, right upper extremity pain radiating to her triceps, difficulty swallowing, and difficulty walking.

## 2019-03-11 NOTE — DATA REVIEWED
[de-identified] : MRI C/T/L-spine: Chiari 1 malformation with 1.6cm of cerebellar tonsil descent below C1, C2-C5 syrinx

## 2019-04-04 ENCOUNTER — APPOINTMENT (OUTPATIENT)
Dept: PHYSICAL MEDICINE AND REHAB | Facility: CLINIC | Age: 59
End: 2019-04-04

## 2019-05-17 ENCOUNTER — APPOINTMENT (OUTPATIENT)
Dept: PHYSICAL MEDICINE AND REHAB | Facility: CLINIC | Age: 59
End: 2019-05-17
Payer: MEDICAID

## 2019-05-17 VITALS
HEART RATE: 106 BPM | DIASTOLIC BLOOD PRESSURE: 80 MMHG | TEMPERATURE: 98.4 F | SYSTOLIC BLOOD PRESSURE: 133 MMHG | OXYGEN SATURATION: 98 %

## 2019-05-17 DIAGNOSIS — M25.511 PAIN IN RIGHT SHOULDER: ICD-10-CM

## 2019-05-17 PROCEDURE — 99213 OFFICE O/P EST LOW 20 MIN: CPT

## 2019-05-20 PROBLEM — M25.511 RIGHT SHOULDER PAIN: Status: ACTIVE | Noted: 2018-02-01

## 2019-05-20 NOTE — HISTORY OF PRESENT ILLNESS
[FreeTextEntry1] : Niurka is a pleasant 58 year-old female who returns for follow up. She is status post bilateral mastectomies with tissue expanders on 8/15/17. Final pathology of the right breast was consistent with DCIS (ER+/AR+) with 10 negative right axillary lymph nodes.  She has been going to PT for  lymphedema therapy. She continues to have axillary pain, has only tried Gabapentin sporadically. \par \par Interval: has seen NSG who recommend surgery for her Chiari malformation\par She continues to have right shoulder pain, decreased ROm \par She continues to have daily headaches and dizziness.

## 2019-05-28 ENCOUNTER — APPOINTMENT (OUTPATIENT)
Dept: SPINE | Facility: CLINIC | Age: 59
End: 2019-05-28

## 2019-06-10 ENCOUNTER — APPOINTMENT (OUTPATIENT)
Dept: RADIOLOGY | Facility: CLINIC | Age: 59
End: 2019-06-10

## 2019-08-02 ENCOUNTER — EMERGENCY (EMERGENCY)
Facility: HOSPITAL | Age: 59
LOS: 0 days | Discharge: HOME | End: 2019-08-02
Attending: EMERGENCY MEDICINE
Payer: MEDICAID

## 2019-08-02 VITALS
SYSTOLIC BLOOD PRESSURE: 139 MMHG | HEART RATE: 88 BPM | OXYGEN SATURATION: 99 % | DIASTOLIC BLOOD PRESSURE: 91 MMHG | HEIGHT: 69 IN | WEIGHT: 237 LBS | TEMPERATURE: 98 F | RESPIRATION RATE: 19 BRPM

## 2019-08-02 VITALS
DIASTOLIC BLOOD PRESSURE: 86 MMHG | SYSTOLIC BLOOD PRESSURE: 138 MMHG | HEART RATE: 76 BPM | OXYGEN SATURATION: 99 % | TEMPERATURE: 99 F | RESPIRATION RATE: 17 BRPM

## 2019-08-02 DIAGNOSIS — Z98.890 OTHER SPECIFIED POSTPROCEDURAL STATES: Chronic | ICD-10-CM

## 2019-08-02 DIAGNOSIS — Z79.82 LONG TERM (CURRENT) USE OF ASPIRIN: ICD-10-CM

## 2019-08-02 DIAGNOSIS — E66.9 OBESITY, UNSPECIFIED: ICD-10-CM

## 2019-08-02 DIAGNOSIS — G93.5 COMPRESSION OF BRAIN: ICD-10-CM

## 2019-08-02 DIAGNOSIS — J45.909 UNSPECIFIED ASTHMA, UNCOMPLICATED: ICD-10-CM

## 2019-08-02 DIAGNOSIS — Z79.899 OTHER LONG TERM (CURRENT) DRUG THERAPY: ICD-10-CM

## 2019-08-02 DIAGNOSIS — Z90.13 ACQUIRED ABSENCE OF BILATERAL BREASTS AND NIPPLES: Chronic | ICD-10-CM

## 2019-08-02 DIAGNOSIS — G47.33 OBSTRUCTIVE SLEEP APNEA (ADULT) (PEDIATRIC): ICD-10-CM

## 2019-08-02 DIAGNOSIS — R51 HEADACHE: ICD-10-CM

## 2019-08-02 DIAGNOSIS — G44.89 OTHER HEADACHE SYNDROME: ICD-10-CM

## 2019-08-02 DIAGNOSIS — Z79.84 LONG TERM (CURRENT) USE OF ORAL HYPOGLYCEMIC DRUGS: ICD-10-CM

## 2019-08-02 DIAGNOSIS — E11.9 TYPE 2 DIABETES MELLITUS WITHOUT COMPLICATIONS: ICD-10-CM

## 2019-08-02 DIAGNOSIS — I10 ESSENTIAL (PRIMARY) HYPERTENSION: ICD-10-CM

## 2019-08-02 PROCEDURE — 70450 CT HEAD/BRAIN W/O DYE: CPT | Mod: 26

## 2019-08-02 PROCEDURE — 99284 EMERGENCY DEPT VISIT MOD MDM: CPT

## 2019-08-02 RX ADMIN — Medication 0.1 MILLIGRAM(S): at 10:02

## 2019-08-02 NOTE — ED PROVIDER NOTE - OBJECTIVE STATEMENT
Pt is a 58 yo lady with a pmhx of HTn, DM, asthma who presents to the ED with ha for 4 days. Also has been noting bp fluctuations in the setting of her cardiologist changing her bp medications earlier this week. Headache is constant, diffuse. Not worse with light or sound, no n/v/d. No neck pain, no fevers. No chest pain, no sob, no abd pain.

## 2019-08-02 NOTE — ED PROVIDER NOTE - PROGRESS NOTE DETAILS
Pt headache resolved, CT head shows known Chiari malformation, pt will f/u w her neurologist and her cardiologist regarding BP meds.

## 2019-08-02 NOTE — ED ADULT TRIAGE NOTE - CHIEF COMPLAINT QUOTE
patient  c/o of headache, dizziness, and tingling L hand for couple of days , denied chest pain, denied back pain at the time of triage , patient stated " my BP is high for some days this morning was 157/101"

## 2019-08-02 NOTE — ED ADULT NURSE NOTE - OBJECTIVE STATEMENT
PT reported headache and uncontrolled high BP since started new BP med x 4 days. also c/o of numbness of b/l lower and upper extremities

## 2019-08-02 NOTE — ED ADULT NURSE NOTE - NSIMPLEMENTINTERV_GEN_ALL_ED
Implemented All Universal Safety Interventions:  [place] to call system. Call bell, personal items and telephone within reach. Instruct patient to call for assistance. Room bathroom lighting operational. Non-slip footwear when patient is off stretcher. Physically safe environment: no spills, clutter or unnecessary equipment. Stretcher in lowest position, wheels locked, appropriate side rails in place.

## 2019-08-02 NOTE — ED PROVIDER NOTE - CLINICAL SUMMARY MEDICAL DECISION MAKING FREE TEXT BOX
Ddx: tension ha/ no focal neuro symptoms, no sudden onset or nuchal rigidity to suggest sah or fever  Plan: CT head, clonidine, reassess

## 2019-09-01 ENCOUNTER — OUTPATIENT (OUTPATIENT)
Dept: OUTPATIENT SERVICES | Facility: HOSPITAL | Age: 59
LOS: 1 days | End: 2019-09-01
Payer: MEDICAID

## 2019-09-01 DIAGNOSIS — Z90.13 ACQUIRED ABSENCE OF BILATERAL BREASTS AND NIPPLES: Chronic | ICD-10-CM

## 2019-09-01 DIAGNOSIS — Z98.890 OTHER SPECIFIED POSTPROCEDURAL STATES: Chronic | ICD-10-CM

## 2019-09-01 PROCEDURE — G9001: CPT

## 2019-09-09 ENCOUNTER — APPOINTMENT (OUTPATIENT)
Dept: PHYSICAL MEDICINE AND REHAB | Facility: CLINIC | Age: 59
End: 2019-09-09
Payer: MEDICAID

## 2019-09-09 VITALS
DIASTOLIC BLOOD PRESSURE: 85 MMHG | OXYGEN SATURATION: 96 % | SYSTOLIC BLOOD PRESSURE: 133 MMHG | TEMPERATURE: 98.2 F | HEART RATE: 98 BPM

## 2019-09-09 DIAGNOSIS — M25.561 PAIN IN RIGHT KNEE: ICD-10-CM

## 2019-09-09 DIAGNOSIS — M25.562 PAIN IN RIGHT KNEE: ICD-10-CM

## 2019-09-09 PROCEDURE — 99212 OFFICE O/P EST SF 10 MIN: CPT

## 2019-09-10 PROBLEM — M25.561 BILATERAL KNEE PAIN: Status: ACTIVE | Noted: 2018-02-01

## 2019-09-10 NOTE — HISTORY OF PRESENT ILLNESS
[FreeTextEntry1] : Niurka is a pleasant 58 year-old female who returns for follow up. She is status post bilateral mastectomies with tissue expanders on 8/15/17. Final pathology of the right breast was consistent with DCIS (ER+/AL+) with 10 negative right axillary lymph nodes.  She has been going to PT for  lymphedema therapy. She continues to have axillary pain, has only tried Gabapentin sporadically. \par \par Interval: has seen NSG who recommend surgery for her Chiari malformation, she is pending a second opinion \par She continues to have b/l knee pain \par She continues to have headaches and dizziness.

## 2019-09-10 NOTE — PHYSICAL EXAM
[FreeTextEntry1] : General Appearance: Well developed, well nourished, in no acute distress.\par Skin: Inspection of the skin reveals no redness. \par HEENT: The sclerae were anicteric and conjunctivae were pink and moist. \par Chest: Normal chest expansion. \par Musculoskeletal: Decreased ROM right knee, + mild swelling \par Neurologic: The patient has normal muscle strength in bilateral upper and lower extremities.  Gait is antalgic.

## 2019-09-12 DIAGNOSIS — Z71.89 OTHER SPECIFIED COUNSELING: ICD-10-CM

## 2019-10-13 ENCOUNTER — EMERGENCY (EMERGENCY)
Facility: HOSPITAL | Age: 59
LOS: 0 days | Discharge: HOME | End: 2019-10-13
Payer: MEDICAID

## 2019-10-13 VITALS
RESPIRATION RATE: 18 BRPM | WEIGHT: 242.07 LBS | HEART RATE: 98 BPM | TEMPERATURE: 99 F | SYSTOLIC BLOOD PRESSURE: 138 MMHG | HEIGHT: 68 IN | DIASTOLIC BLOOD PRESSURE: 83 MMHG

## 2019-10-13 DIAGNOSIS — E78.5 HYPERLIPIDEMIA, UNSPECIFIED: ICD-10-CM

## 2019-10-13 DIAGNOSIS — Z98.890 OTHER SPECIFIED POSTPROCEDURAL STATES: Chronic | ICD-10-CM

## 2019-10-13 DIAGNOSIS — M25.562 PAIN IN LEFT KNEE: ICD-10-CM

## 2019-10-13 DIAGNOSIS — Z79.84 LONG TERM (CURRENT) USE OF ORAL HYPOGLYCEMIC DRUGS: ICD-10-CM

## 2019-10-13 DIAGNOSIS — Z90.13 ACQUIRED ABSENCE OF BILATERAL BREASTS AND NIPPLES: Chronic | ICD-10-CM

## 2019-10-13 DIAGNOSIS — I10 ESSENTIAL (PRIMARY) HYPERTENSION: ICD-10-CM

## 2019-10-13 DIAGNOSIS — M25.50 PAIN IN UNSPECIFIED JOINT: ICD-10-CM

## 2019-10-13 DIAGNOSIS — G47.33 OBSTRUCTIVE SLEEP APNEA (ADULT) (PEDIATRIC): ICD-10-CM

## 2019-10-13 DIAGNOSIS — E11.9 TYPE 2 DIABETES MELLITUS WITHOUT COMPLICATIONS: ICD-10-CM

## 2019-10-13 PROCEDURE — 99283 EMERGENCY DEPT VISIT LOW MDM: CPT

## 2019-10-13 RX ORDER — ASPIRIN/CALCIUM CARB/MAGNESIUM 324 MG
1 TABLET ORAL
Qty: 0 | Refills: 0 | DISCHARGE

## 2019-10-13 RX ORDER — VALSARTAN 80 MG/1
0 TABLET ORAL
Qty: 0 | Refills: 0 | DISCHARGE

## 2019-10-13 RX ORDER — ROSUVASTATIN CALCIUM 5 MG/1
1 TABLET ORAL
Qty: 0 | Refills: 0 | DISCHARGE

## 2019-10-13 RX ORDER — IBUPROFEN 200 MG
800 TABLET ORAL ONCE
Refills: 0 | Status: COMPLETED | OUTPATIENT
Start: 2019-10-13 | End: 2019-10-13

## 2019-10-13 RX ORDER — METFORMIN HYDROCHLORIDE 850 MG/1
1 TABLET ORAL
Qty: 0 | Refills: 0 | DISCHARGE

## 2019-10-13 RX ORDER — ALBUTEROL 90 UG/1
2 AEROSOL, METERED ORAL
Qty: 0 | Refills: 0 | DISCHARGE

## 2019-10-13 RX ORDER — FLUTICASONE PROPIONATE AND SALMETEROL 50; 250 UG/1; UG/1
1 POWDER ORAL; RESPIRATORY (INHALATION)
Qty: 0 | Refills: 0 | DISCHARGE

## 2019-10-13 RX ADMIN — Medication 800 MILLIGRAM(S): at 19:23

## 2019-10-13 RX ADMIN — Medication 800 MILLIGRAM(S): at 19:41

## 2019-10-13 NOTE — ED ADULT NURSE NOTE - NSIMPLEMENTINTERV_GEN_ALL_ED
Implemented All Fall Risk Interventions:  [place] to call system. Call bell, personal items and telephone within reach. Instruct patient to call for assistance. Room bathroom lighting operational. Non-slip footwear when patient is off stretcher. Physically safe environment: no spills, clutter or unnecessary equipment. Stretcher in lowest position, wheels locked, appropriate side rails in place. Provide visual cue, wrist band, yellow gown, etc. Monitor gait and stability. Monitor for mental status changes and reorient to person, place, and time. Review medications for side effects contributing to fall risk. Reinforce activity limits and safety measures with patient and family.

## 2019-10-13 NOTE — ED ADULT NURSE NOTE - OBJECTIVE STATEMENT
Pt came in c/o pain to the left knee after hearing a "pop' while walking denies any fall or trauma 9/10 constant pain that started an hour and a half ago.  Denies fever , N, V.  Denies taking medications for pain.  PMH HTN, DM, HLD .  Bilateral mastectomy 2017 (CA right breast)  . No chemo/radiation.

## 2019-10-17 NOTE — ED PROVIDER NOTE - CLINICAL SUMMARY MEDICAL DECISION MAKING FREE TEXT BOX
58 y/o F presents to ED with left knee pain, relieve with meds, exam reveals TTP over the MCL, knee immobilizer applied pt was dc home on crutches to f/u with orthopedic for outpatient MRI. she will return to ED if worsening pain.

## 2019-10-17 NOTE — ED PROVIDER NOTE - PATIENT PORTAL LINK FT
You can access the FollowMyHealth Patient Portal offered by [facility] by registering at the following website: [email or web address]. By joining [employer]’s FollowMyHealth portal, you will also be able to view your health information using other applications (apps) compatible with our system.

## 2019-10-17 NOTE — ED PROVIDER NOTE - OBJECTIVE STATEMENT
58 y/o F presents to clinic c/o left knee pain today pt stated that she was walking and her knee gave away no associated symptoms denies trauma, numbness, fall and other concerns.

## 2019-10-17 NOTE — ED PROVIDER NOTE - MUSCULOSKELETAL, MLM
Spine appears normal, range of motion is not limited, no muscle left knee TTP medially with positive valgus test, no swelling antalgic gait due to pain.

## 2019-10-24 ENCOUNTER — APPOINTMENT (OUTPATIENT)
Dept: PHYSICAL MEDICINE AND REHAB | Facility: CLINIC | Age: 59
End: 2019-10-24
Payer: MEDICAID

## 2019-10-24 VITALS — HEART RATE: 106 BPM | DIASTOLIC BLOOD PRESSURE: 83 MMHG | SYSTOLIC BLOOD PRESSURE: 135 MMHG | OXYGEN SATURATION: 96 %

## 2019-10-24 PROCEDURE — 99212 OFFICE O/P EST SF 10 MIN: CPT

## 2019-10-29 NOTE — HISTORY OF PRESENT ILLNESS
[FreeTextEntry1] : Niurka is a pleasant 59  year-old female who returns for follow up. She is status post bilateral mastectomies with tissue expanders on 8/15/17. Final pathology of the right breast was consistent with DCIS (ER+/TN+) with 10 negative right axillary lymph nodes.  She has been going to PT for  lymphedema therapy. She continues to have axillary pain, has only tried Gabapentin sporadically. \par \par Interval: has seen NSG who recommend surgery for her Chiari malformation, she is pending a second opinion \par She continues to have b/l knee pain now s/p fall. \par She continues to have headaches and dizziness.

## 2019-12-26 ENCOUNTER — APPOINTMENT (OUTPATIENT)
Dept: PHYSICAL MEDICINE AND REHAB | Facility: CLINIC | Age: 59
End: 2019-12-26

## 2020-01-26 NOTE — ED ADULT NURSE NOTE - CAS TRG GENERAL NORM CIRC DET
Patient cooperative and calm in room watching TV   Offering no complaints at this time, Lunch ordered by sitter  1:1 continues       202 Rosalia Delgadillo, RN  01/26/20 0452 Strong peripheral pulses

## 2020-01-27 ENCOUNTER — APPOINTMENT (OUTPATIENT)
Dept: SURGICAL ONCOLOGY | Facility: CLINIC | Age: 60
End: 2020-01-27

## 2020-01-30 ENCOUNTER — APPOINTMENT (OUTPATIENT)
Dept: PULMONOLOGY | Facility: CLINIC | Age: 60
End: 2020-01-30
Payer: COMMERCIAL

## 2020-01-30 VITALS
WEIGHT: 240 LBS | HEART RATE: 95 BPM | DIASTOLIC BLOOD PRESSURE: 80 MMHG | SYSTOLIC BLOOD PRESSURE: 131 MMHG | BODY MASS INDEX: 35.55 KG/M2 | OXYGEN SATURATION: 98 % | RESPIRATION RATE: 16 BRPM | TEMPERATURE: 98.3 F | HEIGHT: 69 IN

## 2020-01-30 DIAGNOSIS — R05 COUGH: ICD-10-CM

## 2020-01-30 DIAGNOSIS — Z87.09 PERSONAL HISTORY OF OTHER DISEASES OF THE RESPIRATORY SYSTEM: ICD-10-CM

## 2020-01-30 PROCEDURE — 99204 OFFICE O/P NEW MOD 45 MIN: CPT | Mod: 25

## 2020-01-30 PROCEDURE — 94727 GAS DIL/WSHOT DETER LNG VOL: CPT

## 2020-01-30 PROCEDURE — 94060 EVALUATION OF WHEEZING: CPT

## 2020-01-30 PROCEDURE — 94729 DIFFUSING CAPACITY: CPT

## 2020-01-30 RX ORDER — NIFEDIPINE 30 MG/1
30 TABLET, EXTENDED RELEASE ORAL
Qty: 30 | Refills: 0 | Status: COMPLETED | COMMUNITY
Start: 2017-08-16 | End: 2020-01-30

## 2020-01-30 RX ORDER — NIFEDIPINE 30 MG/1
30 TABLET, FILM COATED, EXTENDED RELEASE ORAL
Refills: 0 | Status: COMPLETED | COMMUNITY
End: 2020-01-30

## 2020-01-30 NOTE — PHYSICAL EXAM
[No Acute Distress] : no acute distress [Normal Oropharynx] : normal oropharynx [Erythema] : erythema [No Neck Mass] : no neck mass [Normal Rate/Rhythm] : normal rate/rhythm [Normal S1, S2] : normal s1, s2 [Clear to Auscultation Bilaterally] : clear to auscultation bilaterally [No Abnormalities] : no abnormalities [No HSM] : no hsm [No Clubbing] : no clubbing [No Edema] : no edema [No Focal Deficits] : no focal deficits [Normal Mood] : normal mood [Oriented x3] : oriented x3 [Normal Affect] : normal affect

## 2020-01-30 NOTE — DISCUSSION/SUMMARY
[FreeTextEntry1] : She is a 60 year old woman with a history of hypertension, hyperlipidemia, diabetes, breast cancer and asthma. Her asthma appears to be mild. She never smoked. \par \par Her asthma is not active now. She was advised to continue with Advair and albuterol as needed. \par \par Her cough could be a consequence of reflux. She may need to see GI. Weight loss should help as well. \par \par I will see her again as needed. \par \par To follow up with Dr. Salazar.

## 2020-01-30 NOTE — HISTORY OF PRESENT ILLNESS
[Never] : never [TextBox_4] : She is a 60 year old woman with a history of hypertension, hyperlipidemia, diabetes and asthma. \par \par Has been having a chronic cough. For the most part it is non-productive. Wheezes on occasion. Dyspnea on exertion only. No chest pain or pressure. Has been on Advair which she does not use on a regular basis.

## 2020-01-30 NOTE — PROCEDURE
[FreeTextEntry1] : PFT 1/30/20: No obstruction. Mild restriction. Diffusion was mildly reduced. No change after bronchodilator.

## 2020-01-30 NOTE — REVIEW OF SYSTEMS
[Cough] : cough [Wheezing] : wheezing [GERD] : gerd [Diabetes] : diabetes [Fever] : no fever [Chest Tightness] : no chest tightness [Sputum] : no sputum [Dyspnea] : no dyspnea [Chest Discomfort] : no chest discomfort [Edema] : no edema [Nasal Discharge] : no nasal discharge [Anemia] : no anemia [Myalgias] : no myalgias [Depression] : no depression [Headache] : no headache

## 2020-03-05 ENCOUNTER — APPOINTMENT (OUTPATIENT)
Age: 60
End: 2020-03-05
Payer: COMMERCIAL

## 2020-03-05 VITALS
RESPIRATION RATE: 16 BRPM | DIASTOLIC BLOOD PRESSURE: 92 MMHG | HEART RATE: 89 BPM | SYSTOLIC BLOOD PRESSURE: 153 MMHG | OXYGEN SATURATION: 96 % | BODY MASS INDEX: 34.96 KG/M2 | HEIGHT: 69 IN | WEIGHT: 236 LBS

## 2020-03-05 PROCEDURE — 99214 OFFICE O/P EST MOD 30 MIN: CPT

## 2020-03-05 NOTE — ASSESSMENT
[FreeTextEntry1] : Niurka will follow up with me in one year. She'll contact me upon completion the thyroid ultrasound to discuss pertinent findings.

## 2020-03-05 NOTE — PHYSICAL EXAM
[Normal] : supple, no neck mass and thyroid not enlarged [Normal Supraclavicular Lymph Nodes] : normal supraclavicular lymph nodes [Normal Axillary Lymph Nodes] : normal axillary lymph nodes [Normal] : oriented to person, place and time, with appropriate affect [FreeTextEntry1] : AB present during exam  [de-identified] : no thyroid mass/nodule, no cervical lymphadenopathy [de-identified] : s/p bilateral mastectomies with implant placement. No palpable subcutaneous nodules involving the chest wall. No axillary adenopathy on either side.

## 2020-03-05 NOTE — CONSULT LETTER
[Dear  ___] : Dear  [unfilled], [Consult Letter:] : I had the pleasure of evaluating your patient, [unfilled]. [Please see my note below.] : Please see my note below. [Consult Closing:] : Thank you very much for allowing me to participate in the care of this patient.  If you have any questions, please do not hesitate to contact me. [Sincerely,] : Sincerely, [DrSvetlana  ___] : Dr. GIORDANO [FreeTextEntry2] : Melanie Pascual MD  [FreeTextEntry1] : Niurka is a pleasant 60 year-old female who returns for follow up.  She is status post bilateral mastectomies with tissue expanders on 8/15/17.  Final pathology of the right breast was consistent with DCIS (ER+/ME+) with 10 negative right axillary lymph nodes, 8 of which were sentinel nodes.  Final pathology of the left breast and 2 left axillary sentinel lymph nodes were benign. She is s/p revision bilateral breast reconstruction and placement of implants with Dr. Ramachandran on 3/5/18.  \par \par She was seen in the office on 11/5/18 at which point she continued to experience bilateral chest wall discomfort with referred pain to Right shoulder.  She stated that pain is becoming unbearable and she was considering having the implants removed.   She completed PT and has been using a lymphedema machine.  Since that time she continues to be followed by Dr. Espinosa from [facility] and had an orthopedic evaluation.  MRI of her spine indicated a chiari malformation as well as lumbar stenosis.  She was evaluated by Dr. Edgardo Panda who felt she may benefit from a suboccipital craniectomy, C1 laminectomy, and possible duraplasty but she declined surgery.\par \par I had asked her to complete a breast MRI in November 2018, however, she became ill prior to contrast administration and the exam was terminated.  There was incidental note of skin thickening and subcutaneous stranding in the lower inner right breast. \par \par She denies any palpable subcutaneous masses involving the reconstructed breasts or chest wall bilaterally although she continues to experience intermittent pain involving the chest wall and breasts (right>left).  She will eventually pursue further revision of her reconstruction with Dr. Ramachandran.   She has not done so because her son recently passed away in a motor vehicle accident.  She also reports intermittent swelling on the right side of her neck for the past year. \par \par Both sides are consistent with mastectomies.  There are no subcutaneous masses on either side. There is no axillary adenopathy on either side.  There is no palpable thyroid masses, however the thyroid does appear prominent. There is no cervical lymphadenopathy.\par \par Niurka will follow up with me in one year. She'll contact me upon completion the thyroid ultrasound to discuss pertinent findings. [FreeTextEntry3] : Elbert Savage MD\par Surgical Oncology\par [facility]\par Office: [phone]\par Cell: [phone]

## 2020-03-05 NOTE — HISTORY OF PRESENT ILLNESS
[de-identified] : Niurka is a pleasant 60 year-old female who returns for follow up.  She is status post bilateral mastectomies with tissue expanders on 8/15/17.  Final pathology of the right breast was consistent with DCIS (ER+/NM+) with 10 negative right axillary lymph nodes, 8 of which were sentinel nodes.  Final pathology of the left breast and 2 left axillary sentinel lymph nodes were benign. She is s/p revision bilateral breast reconstruction and placement of implants with Dr. Ramachandran on 3/5/18.  \par \par She was seen in the office on 11/5/18 at which point she continued to experience bilateral chest wall discomfort with referred pain to Right shoulder.  She stated that pain is becoming unbearable and she was considering having the implants removed.   She completed PT and has been using a lymphedema machine.  Since that time she continues to be followed by Dr. Espinosa from [facility] and had an orthopedic evaluation.  MRI of her spine indicated a chiari malformation as well as lumbar stenosis.  She was evaluated by Dr. Edgardo Panda who felt she may benefit from a suboccipital craniectomy, C1 laminectomy, and possible duraplasty but she declined surgery.\par \par I had asked her to complete a breast MRI in November 2018, however, she became ill prior to contrast administration and the exam was terminated.  There was incidental note of skin thickening and subcutaneous stranding in the lower inner right breast. \par \par She denies any palpable subcutaneous masses involving the reconstructed breasts or chest wall bilaterally although she continues to experience intermittent pain involving the chest wall and breasts (right>left).  She will eventually pursue further revision of her reconstruction with Dr. Ramachandran.   She has not done so because her son recently passed away in a motor vehicle accident.  She also reports intermittent swelling on the right side of her neck for the past year. \par \par Previous pertinent history is as follows:\par \par She was initially seen for consultation on 7/20/17.  In April 2017 she completed her annual screening bilateral mammogram and sonogram.  Mammographically, there is a large amount of microcalcifications present in the lower hemisphere of the right breast which are indeterminate (BIRADS 4).  There are probable benign left breast nodules as well for which a 6 month follow up left-sided mammogram is recommended.  She underwent a stereotactic biopsy of the right breast calcifications and final pathology demonstrated multiple foci of DCIS, intermediate to high nuclear grade, with focal central necrosis and calcifications, ER+/NM+/HER2(-).\par \par At the time of her initial presentation, she denied palpable breast mass but noted recent onset of non spontaneous bloody nipple discharge from the right breast. \par \par Her PMH is otherwise notable for HTN, DM II and mild intermittent asthma.  She has no prior issues related to her breasts. She has no family history of breast or ovarian cancer but her father had gastric cancer and her son was diagnosed with retinoblastoma.  \par

## 2020-05-21 NOTE — ASSESSMENT
[FreeTextEntry1] : MRI C-Spine to better characterize Chiari malformation. \par She has an appt with nSg. \par Will need more PT for the knee, shoulder \par Continue Meloxicam 3

## 2020-06-23 NOTE — ED ADULT NURSE NOTE - DRUG PRE-SCREENING (DAST -1)
Pt here for C2D1 Herceptin for Rectal CA.   Arrives Ambulating independently, accompanied by Self           Patient reports possible pregnancy since last therapy cycle: Not Applicable    Modifications in dose or schedule: No     Frequency of blood return an Statement Selected

## 2020-10-22 ENCOUNTER — APPOINTMENT (OUTPATIENT)
Dept: SPINE | Facility: CLINIC | Age: 60
End: 2020-10-22
Payer: MEDICARE

## 2020-10-22 VITALS
TEMPERATURE: 98 F | SYSTOLIC BLOOD PRESSURE: 115 MMHG | BODY MASS INDEX: 35.16 KG/M2 | HEIGHT: 68 IN | HEART RATE: 87 BPM | WEIGHT: 232 LBS | DIASTOLIC BLOOD PRESSURE: 80 MMHG | OXYGEN SATURATION: 94 %

## 2020-10-22 DIAGNOSIS — G95.0 SYRINGOMYELIA AND SYRINGOBULBIA: ICD-10-CM

## 2020-10-22 DIAGNOSIS — G93.5 COMPRESSION OF BRAIN: ICD-10-CM

## 2020-10-22 PROCEDURE — 99212 OFFICE O/P EST SF 10 MIN: CPT

## 2020-10-22 RX ORDER — METFORMIN HYDROCHLORIDE 500 MG/1
500 TABLET, COATED ORAL
Refills: 0 | Status: DISCONTINUED | COMMUNITY
End: 2020-10-22

## 2020-10-22 RX ORDER — LINACLOTIDE 145 UG/1
145 CAPSULE, GELATIN COATED ORAL
Qty: 90 | Refills: 0 | Status: DISCONTINUED | COMMUNITY
Start: 2018-12-11 | End: 2020-10-22

## 2020-10-22 RX ORDER — NIFEDIPINE 60 MG/1
60 TABLET, EXTENDED RELEASE ORAL
Qty: 30 | Refills: 0 | Status: DISCONTINUED | COMMUNITY
Start: 2018-12-05 | End: 2020-10-22

## 2020-10-22 RX ORDER — GABAPENTIN 300 MG/1
300 CAPSULE ORAL
Qty: 14 | Refills: 0 | Status: DISCONTINUED | COMMUNITY
Start: 2018-04-18 | End: 2020-10-22

## 2020-10-22 RX ORDER — MELOXICAM 7.5 MG/1
7.5 TABLET ORAL TWICE DAILY
Qty: 60 | Refills: 0 | Status: DISCONTINUED | COMMUNITY
Start: 2018-09-27 | End: 2020-10-22

## 2020-10-22 NOTE — REASON FOR VISIT
[Follow-Up: _____] : a [unfilled] follow-up visit [Other: _____] : [unfilled] [FreeTextEntry1] : This is a  60 year old female with a known cervical syrinx and she is here for  follow up and a comparison of images of the cervical spine.  She has ongoing   HA, neck pain, dizziness and UE tingling.  She has no progression of her symptoms and no weakness .  She is being followed in Neurology by Dr Lois Combs closely.   A recent MRI of the cervical spine is compared to 2019 images and show stable appearance of the syrinx and the Chiari one malformation.

## 2020-10-22 NOTE — PHYSICAL EXAM
[Cranial Nerves Oculomotor (III)] : extraocular motion intact [Cranial Nerves Facial (VII)] : face symmetrical [Cranial Nerves Hypoglossal (XII)] : there was no tongue deviation with protrusion [Motor Strength] : muscle strength was normal in all four extremities [Sensation Tactile Decrease] : light touch was intact [Abnormal Walk] : normal gait [Paul] : Paul's sign was demonstrated

## 2020-10-22 NOTE — ASSESSMENT
[FreeTextEntry1] : 60 year old female with a known cervical syrinx and CM.  She has a stable appearance of the cervical syrinx of cervical MRI from 9/24/2020  when compared to 9/3/2019 cervical spine MRI.  Her symptoms are not progressing .  She will consider surgery and a suboccipital  craniectomy if her symptoms are overbearing and increase in nature.  Se will continue to be seen by Neurology and maintain serial image follow up for cervical syrinx.

## 2020-10-22 NOTE — REVIEW OF SYSTEMS
[Memory Lapses or Loss] : memory loss [Poor Coordination] : poor coordination [Dizziness] : dizziness [Difficulty Walking] : difficulty walking [Negative] : Heme/Lymph [de-identified] : swallowing difficulty, HA, dizziness

## 2020-10-23 RX ORDER — GLIPIZIDE 2.5 MG/1
TABLET ORAL
Refills: 0 | Status: ACTIVE | COMMUNITY

## 2020-10-23 RX ORDER — MECLIZINE HYDROCHLORIDE 25 MG/1
TABLET ORAL
Refills: 0 | Status: ACTIVE | COMMUNITY

## 2020-10-23 RX ORDER — TELMISARTAN 20 MG/1
TABLET ORAL
Refills: 0 | Status: ACTIVE | COMMUNITY

## 2020-10-23 RX ORDER — SPIRONOLACTONE 50 MG/1
TABLET ORAL
Refills: 0 | Status: ACTIVE | COMMUNITY

## 2020-11-16 NOTE — PATIENT PROFILE ADULT. - PURPOSEFUL PROACTIVE ROUNDING
Clindamycin Counseling: I counseled the patient regarding use of clindamycin as an antibiotic for prophylactic and/or therapeutic purposes. Clindamycin is active against numerous classes of bacteria, including skin bacteria. Side effects may include nausea, diarrhea, gastrointestinal upset, rash, hives, yeast infections, and in rare cases, colitis. Patient

## 2020-12-14 ENCOUNTER — APPOINTMENT (OUTPATIENT)
Dept: PHYSICAL MEDICINE AND REHAB | Facility: CLINIC | Age: 60
End: 2020-12-14
Payer: MEDICARE

## 2020-12-21 ENCOUNTER — APPOINTMENT (OUTPATIENT)
Dept: PHYSICAL MEDICINE AND REHAB | Facility: CLINIC | Age: 60
End: 2020-12-21
Payer: MEDICARE

## 2020-12-21 VITALS
SYSTOLIC BLOOD PRESSURE: 130 MMHG | TEMPERATURE: 97 F | OXYGEN SATURATION: 99 % | DIASTOLIC BLOOD PRESSURE: 82 MMHG | HEART RATE: 85 BPM

## 2020-12-21 DIAGNOSIS — M25.561 PAIN IN RIGHT KNEE: ICD-10-CM

## 2020-12-21 DIAGNOSIS — M25.562 PAIN IN RIGHT KNEE: ICD-10-CM

## 2020-12-21 PROCEDURE — 99212 OFFICE O/P EST SF 10 MIN: CPT

## 2020-12-21 RX ORDER — MELOXICAM 7.5 MG/1
7.5 TABLET ORAL
Qty: 30 | Refills: 0 | Status: ACTIVE | COMMUNITY
Start: 2020-12-21 | End: 1900-01-01

## 2020-12-24 PROBLEM — M25.561 BILATERAL KNEE PAIN: Status: ACTIVE | Noted: 2018-09-27

## 2020-12-24 NOTE — HISTORY OF PRESENT ILLNESS
[FreeTextEntry1] : Niurka is a pleasant 60`  year-old female who returns for follow up. She is status post bilateral mastectomies with tissue expanders on 8/15/17. Final pathology of the right breast was consistent with DCIS (ER+/OR+) with 10 negative right axillary lymph nodes.  She has been going to PT for  lymphedema therapy. She continues to have axillary pain, has only tried Gabapentin sporadically. \par \par Interval: has seen NSG who recommend surgery for her Chiari malformation, she is pending a second opinion \par She continues to have b/l knee pain \par She continues to have headaches and dizziness.

## 2021-02-22 ENCOUNTER — APPOINTMENT (OUTPATIENT)
Dept: PHYSICAL MEDICINE AND REHAB | Facility: CLINIC | Age: 61
End: 2021-02-22

## 2021-04-12 ENCOUNTER — APPOINTMENT (OUTPATIENT)
Dept: SURGICAL ONCOLOGY | Facility: CLINIC | Age: 61
End: 2021-04-12
Payer: MEDICARE

## 2021-04-12 VITALS
OXYGEN SATURATION: 97 % | HEART RATE: 83 BPM | SYSTOLIC BLOOD PRESSURE: 124 MMHG | HEIGHT: 68 IN | DIASTOLIC BLOOD PRESSURE: 79 MMHG | BODY MASS INDEX: 36.07 KG/M2 | RESPIRATION RATE: 16 BRPM | WEIGHT: 238 LBS | TEMPERATURE: 98.1 F

## 2021-04-12 DIAGNOSIS — R22.1 LOCALIZED SWELLING, MASS AND LUMP, NECK: ICD-10-CM

## 2021-04-12 PROCEDURE — 99214 OFFICE O/P EST MOD 30 MIN: CPT

## 2021-04-12 PROCEDURE — 99072 ADDL SUPL MATRL&STAF TM PHE: CPT

## 2021-04-12 NOTE — PHYSICAL EXAM
[Normal] : supple, no neck mass and thyroid not enlarged [Normal Neck Lymph Nodes] : normal neck lymph nodes  [Normal Supraclavicular Lymph Nodes] : normal supraclavicular lymph nodes [Normal Groin Lymph Nodes] : normal groin lymph nodes [Normal Axillary Lymph Nodes] : normal axillary lymph nodes [Normal] : oriented to person, place and time, with appropriate affect [FreeTextEntry1] : Medical staff ( SM ) present during exam\par  [de-identified] : There are no subcutaneous masses on either side.  Implant reconstruction of the present of both sides.  There is no axillary adenopathy on either side.

## 2021-04-12 NOTE — CONSULT LETTER
[Dear  ___] : Dear  [unfilled], [Consult Letter:] : I had the pleasure of evaluating your patient, [unfilled]. [Please see my note below.] : Please see my note below. [Consult Closing:] : Thank you very much for allowing me to participate in the care of this patient.  If you have any questions, please do not hesitate to contact me. [Sincerely,] : Sincerely, [DrSvetlana  ___] : Dr. GIORDANO [FreeTextEntry2] : Melanie Pascual MD  [FreeTextEntry1] : Niurka is a pleasant 61 year-old female who returns for follow up.  She is status post bilateral mastectomies with tissue expanders on 8/15/17.  Final pathology of the right breast was consistent with DCIS (ER+/CO+) with 10 negative right axillary lymph nodes, 8 of which were sentinel nodes.  Final pathology of the left breast and 2 left axillary sentinel lymph nodes were benign. She is s/p revision bilateral breast reconstruction and placement of implants with Dr. Ramachandran on 3/5/18.  \par \par She was seen in the office on 11/5/18 at which point she continued to experience bilateral chest wall discomfort with referred pain to Right shoulder.  She stated that pain is becoming unbearable and she was considering having the implants removed.   She completed PT and has been using a lymphedema machine.  Since that time she continues to be followed by Dr. Espinosa from [facility] and had an orthopedic evaluation.  MRI of her spine indicated a chiari malformation as well as lumbar stenosis.  She was evaluated by Dr. Edgardo Panda who felt she may benefit from a suboccipital craniectomy, C1 laminectomy, and possible duraplasty but she declined surgery.\par \par I had asked her to complete a breast MRI in November 2018, however, she became ill prior to contrast administration and the exam was terminated.  There was incidental note of skin thickening and subcutaneous stranding in the lower inner right breast. \par \par She denies any palpable subcutaneous masses involving the reconstructed breasts or chest wall bilaterally although she continues to experience intermittent pain involving the chest wall and breasts (right>left).  She will eventually pursue further revision of her reconstruction with Dr. Ramachandran.   She has not done so because her son recently passed away in a motor vehicle accident.  She also reports intermittent swelling on the right side of her neck for the past year. \par \par Both sides are consistent with mastectomies.  There are no subcutaneous masses on either side. There is no axillary adenopathy on either side.  There is no palpable thyroid masses, however the thyroid does appear prominent. There is no cervical lymphadenopathy.\par \par Niurka will follow up with me in one year. She'll contact me upon completion the thyroid ultrasound to discuss pertinent findings. [FreeTextEntry3] : Elbert Savage MD\par Surgical Oncology\par [facility]\par Office: [phone]\par Cell: [phone]  [DrSvetlana ___] : Dr. GIORDANO

## 2021-04-12 NOTE — HISTORY OF PRESENT ILLNESS
[de-identified] : Niurka is a pleasant 61 year-old female who returns for follow up.  She is status post bilateral mastectomies with tissue expanders on 8/15/17.  Final pathology of the right breast was consistent with DCIS (ER+/MA+) with 10 negative right axillary lymph nodes, 8 of which were sentinel nodes.  Final pathology of the left breast and 2 left axillary sentinel lymph nodes were benign. She is s/p revision bilateral breast reconstruction and placement of implants with Dr. Ramachandran on 3/5/18.  \par \par Today, she presents due to recent pain and swelling in her right axilla and upper arm. She states "she feels as though something is going on with her implants"  but has not followed up with Dr. Ramachandran regarding this. She states she has fullness and pain in her neck as well. \par \par Pertinent History:\par She was seen in the office on 11/5/18 at which point she continued to experience bilateral chest wall discomfort with referred pain to Right shoulder.  She stated that pain is becoming unbearable and she was considering having the implants removed.   She completed PT and has been using a lymphedema machine.  Since that time she continues to be followed by Dr. Espinosa from [facility] and had an orthopedic evaluation.  MRI of her spine indicated a chiari malformation as well as lumbar stenosis.  She was evaluated by Dr. Edgardo Panda who felt she may benefit from a suboccipital craniectomy, C1 laminectomy, and possible duraplasty but she declined surgery.\par \par I had asked her to complete a breast MRI in November 2018, however, she became ill prior to contrast administration and the exam was terminated.  There was incidental note of skin thickening and subcutaneous stranding in the lower inner right breast. \par \par She denies any palpable subcutaneous masses involving the reconstructed breasts or chest wall bilaterally although she continues to experience intermittent pain involving the chest wall and breasts (right>left).  She will eventually pursue further revision of her reconstruction with Dr. Ramachandran.   She has not done so because her son recently passed away in a motor vehicle accident.  She also reports intermittent swelling on the right side of her neck for the past year. \par \par Previous pertinent history is as follows:\par \par She was initially seen for consultation on 7/20/17.  In April 2017 she completed her annual screening bilateral mammogram and sonogram.  Mammographically, there is a large amount of microcalcifications present in the lower hemisphere of the right breast which are indeterminate (BIRADS 4).  There are probable benign left breast nodules as well for which a 6 month follow up left-sided mammogram is recommended.  She underwent a stereotactic biopsy of the right breast calcifications and final pathology demonstrated multiple foci of DCIS, intermediate to high nuclear grade, with focal central necrosis and calcifications, ER+/MA+/HER2(-).\par \par At the time of her initial presentation, she denied palpable breast mass but noted recent onset of non spontaneous bloody nipple discharge from the right breast. \par \par Her PMH is otherwise notable for HTN, DM II and mild intermittent asthma.  She has no prior issues related to her breasts. She has no family history of breast or ovarian cancer but her father had gastric cancer and her son was diagnosed with retinoblastoma.  \par

## 2021-04-12 NOTE — ASSESSMENT
[FreeTextEntry1] : Niurka will undergo a bilateral chest ultrasound and ultrasound of the neck area to further assess her symptoms.  Provided there are no worrisome findings, she will follow up with me in 1 year.

## 2021-11-30 NOTE — H&P PST ADULT - NS PRO FEM  PAP SMEARS 3YRS
Detail Level: Detailed Quality 226: Preventive Care And Screening: Tobacco Use: Screening And Cessation Intervention: Patient screened for tobacco use and is an ex/non-smoker Quality 130: Documentation Of Current Medications In The Medical Record: Current Medications Documented Quality 431: Preventive Care And Screening: Unhealthy Alcohol Use - Screening: Patient screened for unhealthy alcohol use using a single question and scores less than 2 times per year Quality 47: Advance Care Plan: Advance care planning not documented, reason not otherwise specified. yes

## 2022-01-17 NOTE — H&P CARDIOLOGY - NEGATIVE CARDIOVASCULAR SYMPTOMS
Patient here for abdominal pain in Left upper quadrant, denies any V/D/F, endorses nausea. Per mother gave miralax and patient had minimal relief. Per mother "as soon as she eats she is crying in pain." IUTD, no pmh. Patient awake, alert, calm and cooperative during triage. no paroxysmal nocturnal dyspnea/no claudication/no orthopnea/no peripheral edema

## 2022-03-15 NOTE — H&P CARDIOLOGY - OCCUPATION
The patient has been examined and the H&P has been reviewed:    I concur with the findings and no changes have occurred since H&P was written.    Procedure risks, benefits and alternative options discussed and understood by patient/family.          There are no hospital problems to display for this patient.    
Nurse

## 2022-04-19 NOTE — ED PROVIDER NOTE - PROGRESS NOTE DETAILS
aware of patients pain and hypertension     Harjeet Vang, RN  04/19/22 7227 Discussed need to admit with patient & discussed risk and benefits.  Patient agreed to admission.  Discussed case w/ admitting cardiologist - agreed to admit to their service.  Tele PA contacted. Dr Bloch- Possible witness of ?absence- pulse strong, listened tome asking her to breathe deep, but staring into space for a few seconds.

## 2022-05-02 ENCOUNTER — APPOINTMENT (OUTPATIENT)
Dept: ULTRASOUND IMAGING | Facility: IMAGING CENTER | Age: 62
End: 2022-05-02

## 2022-05-10 ENCOUNTER — OUTPATIENT (OUTPATIENT)
Dept: OUTPATIENT SERVICES | Facility: HOSPITAL | Age: 62
LOS: 1 days | End: 2022-05-10
Payer: COMMERCIAL

## 2022-05-10 ENCOUNTER — APPOINTMENT (OUTPATIENT)
Dept: ULTRASOUND IMAGING | Facility: IMAGING CENTER | Age: 62
End: 2022-05-10
Payer: MEDICARE

## 2022-05-10 ENCOUNTER — RESULT REVIEW (OUTPATIENT)
Age: 62
End: 2022-05-10

## 2022-05-10 DIAGNOSIS — Z00.8 ENCOUNTER FOR OTHER GENERAL EXAMINATION: ICD-10-CM

## 2022-05-10 DIAGNOSIS — Z90.13 ACQUIRED ABSENCE OF BILATERAL BREASTS AND NIPPLES: Chronic | ICD-10-CM

## 2022-05-10 DIAGNOSIS — Z98.890 OTHER SPECIFIED POSTPROCEDURAL STATES: Chronic | ICD-10-CM

## 2022-05-10 PROCEDURE — 76641 ULTRASOUND BREAST COMPLETE: CPT

## 2022-05-10 PROCEDURE — 76641 ULTRASOUND BREAST COMPLETE: CPT | Mod: 26,50

## 2022-05-16 ENCOUNTER — APPOINTMENT (OUTPATIENT)
Dept: SURGICAL ONCOLOGY | Facility: CLINIC | Age: 62
End: 2022-05-16
Payer: MEDICARE

## 2022-05-16 VITALS
BODY MASS INDEX: 34.86 KG/M2 | DIASTOLIC BLOOD PRESSURE: 83 MMHG | HEIGHT: 68 IN | HEART RATE: 68 BPM | WEIGHT: 230 LBS | RESPIRATION RATE: 16 BRPM | OXYGEN SATURATION: 97 % | SYSTOLIC BLOOD PRESSURE: 136 MMHG

## 2022-05-16 PROCEDURE — 99214 OFFICE O/P EST MOD 30 MIN: CPT

## 2022-05-16 NOTE — HISTORY OF PRESENT ILLNESS
[de-identified] : Niurka is a pleasant 62 year-old female who returns for follow up.  \par \par She was initially seen for consultation on 7/20/17.  In April 2017 she completed her annual screening bilateral mammogram and sonogram.  Mammographically, there is a large amount of microcalcifications present in the lower hemisphere of the right breast which are indeterminate (BIRADS 4).  There are probable benign left breast nodules as well for which a 6 month follow up left-sided mammogram is recommended.  She underwent a stereotactic biopsy of the right breast calcifications and final pathology demonstrated multiple foci of DCIS, intermediate to high nuclear grade, with focal central necrosis and calcifications, ER+/KY+/HER2(-).\par \par At the time of her initial presentation, she denied palpable breast mass but noted recent onset of non-spontaneous bloody nipple discharge from the right breast. \par \par She is status post bilateral mastectomies with tissue expanders on 8/15/17.  Final pathology of the right breast was consistent with DCIS (ER+/KY+) with 10 negative right axillary lymph nodes, 8 of which were sentinel nodes.  Final pathology of the left breast and 2 left axillary sentinel lymph nodes were benign. She is s/p revision bilateral breast reconstruction and placement of implants with Dr. Ramachandran on 3/5/18.  \par \par She was seen in the office on 11/5/18 at which point she continued to experience bilateral chest wall discomfort with referred pain to Right shoulder.  She stated that pain is becoming unbearable and she was considering having the implants removed.   She completed PT and has been using a lymphedema machine.  Since that time she continues to be followed by Dr. Espinosa from [facility] and had an orthopedic evaluation.  MRI of her spine indicated a chiari malformation as well as lumbar stenosis.  She was evaluated by Dr. Edgardo Panda who felt she may benefit from a suboccipital craniectomy, C1 laminectomy, and possible duraplasty but she declined surgery.\par \par I had asked her to complete a breast MRI in November 2018, however, she became ill prior to contrast administration and the exam was terminated.  There was incidental note of skin thickening and subcutaneous stranding in the lower inner right breast. \par She denies any palpable subcutaneous masses involving the reconstructed breasts or chest wall bilaterally although she continues to experience intermittent pain involving the chest wall and breasts (right>left).  She will eventually pursue further revision of her reconstruction with Dr. Ramachandran.   She has not done so because her son recently passed away in a motor vehicle accident.  She also reports intermittent swelling on the right side of her neck for the past year. \par \par Her PMH is otherwise notable for HTN, DM II and mild intermittent asthma.  She has no prior issues related to her breasts. She has no family history of breast or ovarian cancer but her father had gastric cancer and her son was diagnosed with retinoblastoma\par \par 4/12/2021: Today, she presents due to recent pain and swelling in her right axilla and upper arm. She states "she feels as though something is going on with her implants" but has not followed up with Dr. Ramachandran regarding this. She states she has fullness and pain in her neck as well. \par \par 5/10/2022: ultrasound performed with negative findings (BI-RADS 1)\par \par Today 5/16/22: Pt with history of b/l mastectomies with implant, patient continue to complaint of b/l chest tightness and axillary tightness. \par

## 2022-05-16 NOTE — PHYSICAL EXAM
[FreeTextEntry1] :   present during exam\par  [de-identified] : There are no subcutaneous masses on either side.  Implant reconstruction of the present of both sides.  There is no axillary adenopathy on either side.

## 2022-05-16 NOTE — CONSULT LETTER
[FreeTextEntry2] : Melanie Pascual MD  [FreeTextEntry3] : Elbert Savage MD\par Surgical Oncology\par [facility]\par Office: [phone]\par Cell: [phone]

## 2022-05-31 NOTE — H&P PST ADULT - SOURCE OF INFORMATION, PROFILE
Ongoing SW/CM Assessment/Plan of Care Note     See SW/CM flowsheets for goals and other objective data.    Progress note:  MSW met with pt. Pt irritable, non cooperative, did not speak. Pt needed physical assistance to get out of bed and go to day room. Pt was given encouragement to cooperate, take meds, informed of criteria for discharge. Pt did not respond.    Discharge plan:  Return home, follow up outpatient.    CM/SW team to continue to follow for discharge needs.    ADDENDUM: MSW received call from pt's APS worker Justa. Justa said she has been involved with this case for a month and would like to see pt on unit. Justa is surprised to hear pt has been talking here. There has been unsuccessful effort to put in court ordered mental health treatment on an outpatient basis.      ADDENDUM: MSW spoke with pt's guardian Zane. Provided information on court date and APS worker visit. She verbalized understanding, reported having no questions. Provided clinical update.     patient

## 2022-11-30 NOTE — H&P PST ADULT - EXTREMITIES
Reviewed -Vit D dose increased to 4000 units (100mcg) daily in 5/22 due to improved but still slightly high PTH.  Looks like Vit D is able to be added on to labs done recently, but not PTH.  Will check Vit D now, and both in near future.  Will wait to refill Vit D until lab is back.  CW     detailed exam

## 2022-12-11 NOTE — DISCHARGE NOTE ADULT - MEDICATION SUMMARY - MEDICATIONS TO CHANGE
I will SWITCH the dose or number of times a day I take the medications listed below when I get home from the hospital:  None
Vomiting in Children    Your child was seen in the Emergency Department with vomiting.    Vomiting occurs when stomach contents are thrown up and out of the mouth (and even sometimes from the nose).  Many children notice nausea before vomiting.  Younger children may not recognize nausea, although they may complain of a stomachache.      Most vomiting illnesses are caused by viruses.    Vomiting can make your child feel weak and cause dehydration.  Dehydration can make your child tired and thirsty, cause your child to have a dry mouth, and decrease how often your child urinates.  It is important to treat your child’s vomiting as directed by your child’s health care provider.    General tips for taking care of a child who has vomiting:  Follow these eating and drinking recommendations as directed by your child's health care provider:    Infants:  Continue to breastfeed or bottle-feed your young child.  Do this frequently, in small amounts.  Gradually increase the amount.  Do not give your infant extra water.  Formula fed infants may be supplemented with over the counter oral rehydration solution if older than 4 months.  These special electrolyte solutions are usually not needed for infants who exclusively breastfeed because breastmilk is more easily digested.  If vomiting does not improve within 24 hours, call your child’s doctor.    Older infants and children:  Older infants and children who vomit can continue to eat, if desired.  However, it is very common for children to have little to no appetite during a vomiting illness.    Continue your child’s regular diet, but avoid spicy or fatty foods, such as French fries and pizza.  It is not necessary to restrict a child’s diet to the BRAT diet (bananas, rice, applesauce, toast) as was previously taught.   Encourage your child to drink clear fluids, such as water, low-calorie popsicles, and fruit juice that has water added (diluted fruit juice).  Have your child drink small amounts of clear fluids slowly.  Gradually increase the amount.  Avoid giving your child fluids that contain a lot of sugar or caffeine, such as sports drinks and soda.    Oral rehydration solutions:  Oral rehydration solution is a liquid that contains glucose (a sugar) and electrolytes (sodium, chloride, potassium) which are lost during vomiting illnesses.  These solutions do not cure vomiting, but do help to prevent and treat dehydration.  You can purchase these solutions at most grocery stores and pharmacies without a prescription.  Do not try to prepare oral rehydration solutions at home.    General instructions:  You may have been sent home with a prescription for Ondansetron, an anti-vomiting medicine.  You can give this medication every 8 hours if needed for persistent vomiting or nausea.  Make sure that everyone in your child's household cleans their hands frequently.  Clean home surfaces frequently.  Keep sick children out of school or .    Non-prescription treatments (ex. syrup of ipecac and holistic remedies) for nausea and vomiting are not recommended for infants and children.  Even if an infant or child has ingested a toxic substance it is best to avoid these over-the-counter remedies and immediately call 911 and poison control.   Watch your child’s condition for any changes.  Keep all follow-up visits as told by your child's health care provider. This is important.    *Although most children recover from vomiting without any treatment, it is important to know when to seek help if your child does not get better.    Contact a health care provider and get help right away if:  Your child’s vomiting lasts more than 24 hours.  Your child refuses to drink anything for more than a few hours.  Your child has muscle cramps.  Your child has abdominal pain.  Your child has pain while urinating.    While rarer, vomiting in some instances may be due to an obstruction in the gut requiring treatment or surgery.  If your child has a chronic condition, please consult your healthcare provider or child’s specialist if vomiting occurs or persists regardless of warning signs listed above    Follow up with your pediatrician in 1-2 days to make sure that your child is doing better.    Return to the Emergency Department if your child has:  Your child’s vomit is bright red or looks like black coffee grounds.  Your child has stools that are bloody or black, or stools that look like tar.  Your child has difficulty breathing or is breathing very quickly.  Your child’s heart is beating very quickly.  Your child feels cold and clammy.  Your child has any behavioral change including confusion, decreased responsiveness, or lethargy (sleeps, very difficult to wake).  Your child has a persistent fever.  No urine in 8 hours for infants and 12 hours for older children.  Signed of dehydration: cracked lips/ dry mouth or not making tears while crying.  Excessive thirst.  Cool or clammy hands and feet.  Sunken eyes.  Weakness.

## 2023-01-09 NOTE — ED PROVIDER NOTE - CROS ED EYES ALL NEG
LOV: 10/31/2022    FOV: 5/2/2023    Sildenafil on file, last prescribed by PCP 9/27/22  
negative...

## 2023-03-27 ENCOUNTER — APPOINTMENT (OUTPATIENT)
Dept: SURGICAL ONCOLOGY | Facility: CLINIC | Age: 63
End: 2023-03-27
Payer: MEDICARE

## 2023-03-27 VITALS
BODY MASS INDEX: 31.98 KG/M2 | OXYGEN SATURATION: 98 % | HEIGHT: 68 IN | SYSTOLIC BLOOD PRESSURE: 137 MMHG | DIASTOLIC BLOOD PRESSURE: 79 MMHG | WEIGHT: 211 LBS | RESPIRATION RATE: 15 BRPM | HEART RATE: 76 BPM

## 2023-03-27 PROCEDURE — 99213 OFFICE O/P EST LOW 20 MIN: CPT

## 2023-03-27 NOTE — HISTORY OF PRESENT ILLNESS
[de-identified] : Niurka is a pleasant 63 year-old female who returns for follow up.  \par \par She was initially seen for consultation on 7/20/17.  In April 2017 she completed her annual screening bilateral mammogram and sonogram.  Mammographically, there is a large amount of microcalcifications present in the lower hemisphere of the right breast which are indeterminate (BIRADS 4).  There are probable benign left breast nodules as well for which a 6 month follow up left-sided mammogram is recommended.  She underwent a stereotactic biopsy of the right breast calcifications and final pathology demonstrated multiple foci of DCIS, intermediate to high nuclear grade, with focal central necrosis and calcifications, ER+/MI+/HER2(-).\par \par At the time of her initial presentation, she denied palpable breast mass but noted recent onset of non-spontaneous bloody nipple discharge from the right breast. \par \par She is status post bilateral mastectomies with tissue expanders on 8/15/17.  Final pathology of the right breast was consistent with DCIS (ER+/MI+) with 10 negative right axillary lymph nodes, 8 of which were sentinel nodes.  Final pathology of the left breast and 2 left axillary sentinel lymph nodes were benign. She is s/p revision bilateral breast reconstruction and placement of implants with Dr. Ramachandran on 3/5/18.  \par \par She was seen in the office on 11/5/18 at which point she continued to experience bilateral chest wall discomfort with referred pain to Right shoulder.  She stated that pain is becoming unbearable and she was considering having the implants removed.   She completed PT and has been using a lymphedema machine.  Since that time she continues to be followed by Dr. Espinosa from [facility] and had an orthopedic evaluation.  MRI of her spine indicated a chiari malformation as well as lumbar stenosis.  She was evaluated by Dr. Edgardo Panda who felt she may benefit from a suboccipital craniectomy, C1 laminectomy, and possible duraplasty but she declined surgery.\par \par I had asked her to complete a breast MRI in November 2018, however, she became ill prior to contrast administration and the exam was terminated.  There was incidental note of skin thickening and subcutaneous stranding in the lower inner right breast. \par She denies any palpable subcutaneous masses involving the reconstructed breasts or chest wall bilaterally although she continues to experience intermittent pain involving the chest wall and breasts (right>left).  She will eventually pursue further revision of her reconstruction with Dr. Ramachandran.   She has not done so because her son recently passed away in a motor vehicle accident.  She also reports intermittent swelling on the right side of her neck for the past year. \par \par Her PMH is otherwise notable for HTN, DM II and mild intermittent asthma.  She has no prior issues related to her breasts. She has no family history of breast or ovarian cancer but her father had gastric cancer and her son was diagnosed with retinoblastoma\par \par 4/12/2021: Today, she presents due to recent pain and swelling in her right axilla and upper arm. She states "she feels as though something is going on with her implants" but has not followed up with Dr. Ramachandran regarding this. She states she has fullness and pain in her neck as well. \par \par 5/10/2022: ultrasound performed with negative findings (BI-RADS 1)\par \par 5/16/22: Pt with history of b/l mastectomies with implant, patient continue to complaint of b/l chest tightness and axillary tightness. \par \par 3/27/23: Niurka returns for a one year follow up exam.  Still reports some tightness in her right axilla and neck but doing well overall.  Denies any palpable masses involving the reconstructed breasts or chest wall.  Denies any constitutional symptoms. \par

## 2023-03-27 NOTE — CONSULT LETTER
[Dear  ___] : Dear  [unfilled], [Consult Letter:] : I had the pleasure of evaluating your patient, [unfilled]. [Please see my note below.] : Please see my note below. [Consult Closing:] : Thank you very much for allowing me to participate in the care of this patient.  If you have any questions, please do not hesitate to contact me. [Sincerely,] : Sincerely, [DrSvetlana  ___] : Dr. GIORDANO [DrSvetlana ___] : Dr. GIORDANO [FreeTextEntry2] : Melanie Pascual MD  [FreeTextEntry3] : Elbert Savage MD\par Surgical Oncology\par [facility]\par Office: [phone]\par Cell: [phone]

## 2023-03-27 NOTE — ASSESSMENT
[FreeTextEntry1] : We will arrange for a breast MRI for interval surveillance of the skin flaps and the integrity of the silicone implants.  Niurka will contact us upon completion of imaging to discuss pertinent findings.  Provided there are no worrisome findings, she will follow up in 1 year.

## 2023-03-27 NOTE — PHYSICAL EXAM
[Normal] : supple, no neck mass and thyroid not enlarged [Normal Neck Lymph Nodes] : normal neck lymph nodes  [Normal Supraclavicular Lymph Nodes] : normal supraclavicular lymph nodes [Normal Groin Lymph Nodes] : normal groin lymph nodes [Normal Axillary Lymph Nodes] : normal axillary lymph nodes [Normal] : oriented to person, place and time, with appropriate affect [FreeTextEntry1] : AB  present during exam\par  [de-identified] : There are no subcutaneous masses on either side.  Implant reconstruction of the present of both sides.  There is no axillary adenopathy on either side.

## 2023-04-12 ENCOUNTER — APPOINTMENT (OUTPATIENT)
Dept: PHYSICAL MEDICINE AND REHAB | Facility: CLINIC | Age: 63
End: 2023-04-12

## 2023-04-24 ENCOUNTER — EMERGENCY (EMERGENCY)
Facility: HOSPITAL | Age: 63
LOS: 1 days | Discharge: HOME | End: 2023-04-24
Attending: EMERGENCY MEDICINE | Admitting: STUDENT IN AN ORGANIZED HEALTH CARE EDUCATION/TRAINING PROGRAM
Payer: MEDICARE

## 2023-04-24 VITALS
TEMPERATURE: 98 F | HEART RATE: 76 BPM | DIASTOLIC BLOOD PRESSURE: 96 MMHG | OXYGEN SATURATION: 100 % | RESPIRATION RATE: 20 BRPM | SYSTOLIC BLOOD PRESSURE: 156 MMHG

## 2023-04-24 DIAGNOSIS — Z90.13 ACQUIRED ABSENCE OF BILATERAL BREASTS AND NIPPLES: Chronic | ICD-10-CM

## 2023-04-24 DIAGNOSIS — Z98.890 OTHER SPECIFIED POSTPROCEDURAL STATES: Chronic | ICD-10-CM

## 2023-04-24 LAB
ALBUMIN SERPL ELPH-MCNC: 4.3 G/DL — SIGNIFICANT CHANGE UP (ref 3.3–5)
ALP SERPL-CCNC: 71 U/L — SIGNIFICANT CHANGE UP (ref 40–120)
ALT FLD-CCNC: 12 U/L — SIGNIFICANT CHANGE UP (ref 4–33)
ANION GAP SERPL CALC-SCNC: 12 MMOL/L — SIGNIFICANT CHANGE UP (ref 7–14)
APPEARANCE UR: CLEAR — SIGNIFICANT CHANGE UP
APTT BLD: 33 SEC — SIGNIFICANT CHANGE UP (ref 27–36.3)
AST SERPL-CCNC: 36 U/L — HIGH (ref 4–32)
BASOPHILS # BLD AUTO: 0.03 K/UL — SIGNIFICANT CHANGE UP (ref 0–0.2)
BASOPHILS NFR BLD AUTO: 0.6 % — SIGNIFICANT CHANGE UP (ref 0–2)
BILIRUB SERPL-MCNC: 0.4 MG/DL — SIGNIFICANT CHANGE UP (ref 0.2–1.2)
BILIRUB UR-MCNC: NEGATIVE — SIGNIFICANT CHANGE UP
BUN SERPL-MCNC: 9 MG/DL — SIGNIFICANT CHANGE UP (ref 7–23)
CALCIUM SERPL-MCNC: 8.9 MG/DL — SIGNIFICANT CHANGE UP (ref 8.4–10.5)
CHLORIDE SERPL-SCNC: 97 MMOL/L — LOW (ref 98–107)
CO2 SERPL-SCNC: 22 MMOL/L — SIGNIFICANT CHANGE UP (ref 22–31)
COLOR SPEC: COLORLESS — SIGNIFICANT CHANGE UP
CREAT SERPL-MCNC: 0.58 MG/DL — SIGNIFICANT CHANGE UP (ref 0.5–1.3)
DIFF PNL FLD: NEGATIVE — SIGNIFICANT CHANGE UP
EGFR: 101 ML/MIN/1.73M2 — SIGNIFICANT CHANGE UP
EOSINOPHIL # BLD AUTO: 0.05 K/UL — SIGNIFICANT CHANGE UP (ref 0–0.5)
EOSINOPHIL NFR BLD AUTO: 0.9 % — SIGNIFICANT CHANGE UP (ref 0–6)
GLUCOSE SERPL-MCNC: 93 MG/DL — SIGNIFICANT CHANGE UP (ref 70–99)
GLUCOSE UR QL: NEGATIVE — SIGNIFICANT CHANGE UP
HCT VFR BLD CALC: 43.2 % — SIGNIFICANT CHANGE UP (ref 34.5–45)
HGB BLD-MCNC: 13.9 G/DL — SIGNIFICANT CHANGE UP (ref 11.5–15.5)
IANC: 2.82 K/UL — SIGNIFICANT CHANGE UP (ref 1.8–7.4)
IMM GRANULOCYTES NFR BLD AUTO: 0.2 % — SIGNIFICANT CHANGE UP (ref 0–0.9)
INR BLD: 1.02 RATIO — SIGNIFICANT CHANGE UP (ref 0.88–1.16)
KETONES UR-MCNC: NEGATIVE — SIGNIFICANT CHANGE UP
LEUKOCYTE ESTERASE UR-ACNC: NEGATIVE — SIGNIFICANT CHANGE UP
LYMPHOCYTES # BLD AUTO: 2.03 K/UL — SIGNIFICANT CHANGE UP (ref 1–3.3)
LYMPHOCYTES # BLD AUTO: 37.5 % — SIGNIFICANT CHANGE UP (ref 13–44)
MAGNESIUM SERPL-MCNC: 2.1 MG/DL — SIGNIFICANT CHANGE UP (ref 1.6–2.6)
MCHC RBC-ENTMCNC: 27.9 PG — SIGNIFICANT CHANGE UP (ref 27–34)
MCHC RBC-ENTMCNC: 32.2 GM/DL — SIGNIFICANT CHANGE UP (ref 32–36)
MCV RBC AUTO: 86.6 FL — SIGNIFICANT CHANGE UP (ref 80–100)
MONOCYTES # BLD AUTO: 0.48 K/UL — SIGNIFICANT CHANGE UP (ref 0–0.9)
MONOCYTES NFR BLD AUTO: 8.9 % — SIGNIFICANT CHANGE UP (ref 2–14)
NEUTROPHILS # BLD AUTO: 2.82 K/UL — SIGNIFICANT CHANGE UP (ref 1.8–7.4)
NEUTROPHILS NFR BLD AUTO: 51.9 % — SIGNIFICANT CHANGE UP (ref 43–77)
NITRITE UR-MCNC: NEGATIVE — SIGNIFICANT CHANGE UP
NRBC # BLD: 0 /100 WBCS — SIGNIFICANT CHANGE UP (ref 0–0)
NRBC # FLD: 0 K/UL — SIGNIFICANT CHANGE UP (ref 0–0)
PH UR: 7 — SIGNIFICANT CHANGE UP (ref 5–8)
PLATELET # BLD AUTO: 279 K/UL — SIGNIFICANT CHANGE UP (ref 150–400)
POTASSIUM SERPL-MCNC: 5.2 MMOL/L — SIGNIFICANT CHANGE UP (ref 3.5–5.3)
POTASSIUM SERPL-SCNC: 5.2 MMOL/L — SIGNIFICANT CHANGE UP (ref 3.5–5.3)
PROT SERPL-MCNC: 7.6 G/DL — SIGNIFICANT CHANGE UP (ref 6–8.3)
PROT UR-MCNC: NEGATIVE — SIGNIFICANT CHANGE UP
PROTHROM AB SERPL-ACNC: 11.8 SEC — SIGNIFICANT CHANGE UP (ref 10.5–13.4)
RBC # BLD: 4.99 M/UL — SIGNIFICANT CHANGE UP (ref 3.8–5.2)
RBC # FLD: 14.8 % — HIGH (ref 10.3–14.5)
RBC CASTS # UR COMP ASSIST: SIGNIFICANT CHANGE UP /HPF (ref 0–4)
SODIUM SERPL-SCNC: 131 MMOL/L — LOW (ref 135–145)
SP GR SPEC: 1.01 — LOW (ref 1.01–1.05)
TROPONIN T, HIGH SENSITIVITY RESULT: <6 NG/L — SIGNIFICANT CHANGE UP
UROBILINOGEN FLD QL: SIGNIFICANT CHANGE UP
WBC # BLD: 5.42 K/UL — SIGNIFICANT CHANGE UP (ref 3.8–10.5)
WBC # FLD AUTO: 5.42 K/UL — SIGNIFICANT CHANGE UP (ref 3.8–10.5)
WBC UR QL: SIGNIFICANT CHANGE UP /HPF (ref 0–5)

## 2023-04-24 PROCEDURE — 93010 ELECTROCARDIOGRAM REPORT: CPT

## 2023-04-24 PROCEDURE — 93306 TTE W/DOPPLER COMPLETE: CPT | Mod: 26

## 2023-04-24 PROCEDURE — 70450 CT HEAD/BRAIN W/O DYE: CPT | Mod: 26,MA

## 2023-04-24 PROCEDURE — 93016 CV STRESS TEST SUPVJ ONLY: CPT | Mod: GC

## 2023-04-24 PROCEDURE — 71046 X-RAY EXAM CHEST 2 VIEWS: CPT | Mod: 26

## 2023-04-24 PROCEDURE — 99223 1ST HOSP IP/OBS HIGH 75: CPT | Mod: FS

## 2023-04-24 PROCEDURE — 71275 CT ANGIOGRAPHY CHEST: CPT | Mod: 26,MA

## 2023-04-24 PROCEDURE — 78452 HT MUSCLE IMAGE SPECT MULT: CPT | Mod: 26,MA

## 2023-04-24 PROCEDURE — 93018 CV STRESS TEST I&R ONLY: CPT | Mod: GC

## 2023-04-24 PROCEDURE — 31575 DIAGNOSTIC LARYNGOSCOPY: CPT

## 2023-04-24 RX ORDER — SODIUM CHLORIDE 9 MG/ML
1000 INJECTION, SOLUTION INTRAVENOUS
Refills: 0 | Status: DISCONTINUED | OUTPATIENT
Start: 2023-04-24 | End: 2023-04-27

## 2023-04-24 RX ORDER — LOSARTAN POTASSIUM 100 MG/1
100 TABLET, FILM COATED ORAL DAILY
Refills: 0 | Status: DISCONTINUED | OUTPATIENT
Start: 2023-04-24 | End: 2023-04-27

## 2023-04-24 RX ORDER — DEXTROSE 50 % IN WATER 50 %
12.5 SYRINGE (ML) INTRAVENOUS ONCE
Refills: 0 | Status: DISCONTINUED | OUTPATIENT
Start: 2023-04-24 | End: 2023-04-27

## 2023-04-24 RX ORDER — DEXTROSE 50 % IN WATER 50 %
25 SYRINGE (ML) INTRAVENOUS ONCE
Refills: 0 | Status: DISCONTINUED | OUTPATIENT
Start: 2023-04-24 | End: 2023-04-27

## 2023-04-24 RX ORDER — GLUCAGON INJECTION, SOLUTION 0.5 MG/.1ML
1 INJECTION, SOLUTION SUBCUTANEOUS ONCE
Refills: 0 | Status: DISCONTINUED | OUTPATIENT
Start: 2023-04-24 | End: 2023-04-27

## 2023-04-24 RX ORDER — DEXTROSE 50 % IN WATER 50 %
15 SYRINGE (ML) INTRAVENOUS ONCE
Refills: 0 | Status: DISCONTINUED | OUTPATIENT
Start: 2023-04-24 | End: 2023-04-27

## 2023-04-24 RX ORDER — POLYETHYLENE GLYCOL 3350 17 G/17G
17 POWDER, FOR SOLUTION ORAL DAILY
Refills: 0 | Status: DISCONTINUED | OUTPATIENT
Start: 2023-04-24 | End: 2023-04-27

## 2023-04-24 RX ORDER — SENNA PLUS 8.6 MG/1
2 TABLET ORAL AT BEDTIME
Refills: 0 | Status: DISCONTINUED | OUTPATIENT
Start: 2023-04-24 | End: 2023-04-27

## 2023-04-24 RX ORDER — SPIRONOLACTONE 25 MG/1
25 TABLET, FILM COATED ORAL DAILY
Refills: 0 | Status: DISCONTINUED | OUTPATIENT
Start: 2023-04-24 | End: 2023-04-27

## 2023-04-24 RX ORDER — ASPIRIN/CALCIUM CARB/MAGNESIUM 324 MG
162 TABLET ORAL ONCE
Refills: 0 | Status: COMPLETED | OUTPATIENT
Start: 2023-04-24 | End: 2023-04-24

## 2023-04-24 RX ORDER — ATORVASTATIN CALCIUM 80 MG/1
20 TABLET, FILM COATED ORAL AT BEDTIME
Refills: 0 | Status: DISCONTINUED | OUTPATIENT
Start: 2023-04-24 | End: 2023-04-27

## 2023-04-24 RX ADMIN — LOSARTAN POTASSIUM 100 MILLIGRAM(S): 100 TABLET, FILM COATED ORAL at 18:28

## 2023-04-24 RX ADMIN — Medication 162 MILLIGRAM(S): at 07:35

## 2023-04-24 RX ADMIN — ATORVASTATIN CALCIUM 20 MILLIGRAM(S): 80 TABLET, FILM COATED ORAL at 21:39

## 2023-04-24 RX ADMIN — SPIRONOLACTONE 25 MILLIGRAM(S): 25 TABLET, FILM COATED ORAL at 18:28

## 2023-04-24 NOTE — ED PROVIDER NOTE - ATTENDING CONTRIBUTION TO CARE
63-year-old female, history of obesity, breast cancer status post surgery not on active meds, diabetes, high blood pressure, now presents with 1 day of intermittent right upper extremity paresthesias from the forearm down to all the fingers of the hand.  Patient also reports nonradiating substernal chest pain that is not exertional but she does have shortness of breath intermittently with exertion.  No fever, B symptoms, URI symptoms, active chest pain or shortness of breath in the ED, urinary symptoms, nausea, vomiting, diarrhea.    VS: afebrile, others reassuring  Gen: NAD, appears tired, nontoxic  Head: NC/AT  ENT: moist mucous membranes   Neck: trachea midline  Resp:  No distress  Ext: no deformities  Neuro: A&Ox3, spont. interactive. CN2-12 intact. GCS 15. Strength 5/5 b/l UEs & LEs. No gross sensory deficits. No pronator drift b/l UEs. Finger to nose intact b/l. gait normal/stable/unassisted.   Skin:  Warm and dry as visualized  Psych:  appropriate affect and mood    Emergency Department EKG: NSR @81, nl intervals. No ischemic S-T/Tw changes.     Initial ED MDM: I don't think this is a stroke, especially w/ intermittent purely sensory sx over x24hrs.  Im worried this could be atypical anginal equivalent, especially w/ the GRIGGS. Will also evaluate for PE, mass. Plan to obtain CTA, labs, TSH, CXR, cardiac monitoring.

## 2023-04-24 NOTE — ED PROVIDER NOTE - CLINICAL SUMMARY MEDICAL DECISION MAKING FREE TEXT BOX
Anny Foreman DO PGY-2  63 year old female with PMH HTN, DM, breast ca (s/p mastectomy, never required chemo/radiation) presents to the ED with 1 day of numbness and tingling down right arm associated with central chest pain and dyspnea on exertion. No recent illness, hospitalizations, or surgeries. EKG normal sinus rhythm, vitals stable, in no acute distress. Concern for angina/ACS, will evaluate for PE, mass. Plan to obtain CT, labs, CXR, cardiac monitoring. Dispo pending evaluation.

## 2023-04-24 NOTE — ED PROVIDER NOTE - OBJECTIVE STATEMENT
63-year-old female past medical history of breast cancer status postmastectomy on treatment but not on treatment per radiation, hypertension, diabetes presents to the ED with 1 day of numbness and tingling down right arm including all fingers, intermittently. Patient also reports intermittent shortness of breath and chest pain that is central.  Pain is nonexertional.  Denies recent cardiac work-up.  Denies fever, chills, chest pain or shortness of breath at present, nausea, vomiting, diarrhea. Hasn't taken anything for symptoms.    PCP: Dr. Salazar 63-year-old female past medical history of breast cancer status postmastectomy on no active tx, hypertension, diabetes presents to the ED with 1 day of numbness and tingling down right arm including all fingers, intermittently. Patient also reports intermittent shortness of breath and chest pain that is central.  Pain is nonexertional.  Denies recent cardiac work-up.  Denies fever, chills, chest pain or shortness of breath at present, nausea, vomiting, diarrhea. Hasn't taken anything for symptoms.    PCP: Dr. Salazar

## 2023-04-24 NOTE — ED PROVIDER NOTE - PHYSICAL EXAMINATION
PHYSICAL EXAM:  CONSTITUTIONAL: Well appearing, awake, alert, oriented to person, place, time/situation and in no apparent distress.  HEAD: Atraumatic  EYES: Clear bilaterally, pupils equal, round and reactive to light.  ENMT: Airway patent, Nasal mucosa clear. Mouth with normal mucosa. Uvula is midline.   CARDIAC: Normal rate, regular rhythm. +S1/S2. No murmurs, rubs or gallops.  RESPIRATORY: Breathing unlabored. Breath sounds clear and equal bilaterally.  ABDOMEN:  Soft, nontender, nondistended. No rebound tenderness or guarding.  NEUROLOGICAL: Alert and oriented, no focal deficits, no motor or sensory deficits. CN2-12 intact. Sensation intact x4 extremities. Strength 5/5 of upper and lower extremities B/L. Gait normal.  MSK: No clubbing, cyanosis, or edema. Full range of motion of all extremities. No tenderness to palpation. No midline or paraspinal tenderness. No spinal step-offs.  SKIN: Skin warm and dry. No evidence of rashes or lesions.

## 2023-04-24 NOTE — ED PROVIDER NOTE - NSICDXPASTSURGICALHX_GEN_ALL_CORE_FT
PAST SURGICAL HISTORY:  H/O myomectomy     S/P mastectomy, bilateral with axillary node dissection with expanders

## 2023-04-24 NOTE — ED CDU PROVIDER INITIAL DAY NOTE - CLINICAL SUMMARY MEDICAL DECISION MAKING FREE TEXT BOX
63-year-old female with past medical history of breast cancer status post bilateral mastectomy on no active treatment, hypertension, noninsulin-dependent diabetes, hyperlipidemia, chronic constipation presents to the ER complaining of sternal, nonexertional chest pain for 1 day associated with shortness of breath.  Patient also noting initially having tingling down the right arm to the fingers now having tingling down her left arm to the fingers.  Chest pain has now resolved in the ED labs acutely on actionable.  Troponin <6.  TSH within normal limits.  UA without acute signs of infection, culture sent and pending.  CT angio chest showing no PE, incidentally " medialization of the partial image left true vocal cord, suggestive of vocal cord paralysis.  Patient was seen by ENT direct visualization noted " patient has bilateral vocal cord motion and airway is patent.  No ENT intervention at this time".  CT head performed no acute hemorrhage, mass effect or extra-axial collections.  Chiari I malformation again noted when compared to previous imaging.        Patient placed in CDU for telemetry monitoring, echocardiogram, stress test, telemetry doc of the day evaluation, generalized observation and vital monitoring.  Patient received 162 of aspirin prior to CDU

## 2023-04-24 NOTE — ED PROVIDER NOTE - NSICDXPASTMEDICALHX_GEN_ALL_CORE_FT
PAST MEDICAL HISTORY:  Asthma stable    DM (diabetes mellitus) stable    HLD (hyperlipidemia) no meds    HTN (hypertension) controlled    Obesity     BHARATHI (obstructive sleep apnea)

## 2023-04-24 NOTE — ED ADULT NURSE REASSESSMENT NOTE - NS ED NURSE REASSESS COMMENT FT1
Pt received from outgoing RN in stretcher in no apparent distress. denies discomfort. Respirations even, non-labored. Skin warm, dry, color appropriate. Pending imaging. call bell within reach. Education provided to pt on how to and when to use call bell for assistance. Will continue to monitor.

## 2023-04-24 NOTE — ED PROVIDER NOTE - NSICDXFAMILYHX_GEN_ALL_CORE_FT
FAMILY HISTORY:  Father  Still living? Unknown  Family history of esophageal cancer, Age at diagnosis: Age Unknown  Family history of stomach cancer, Age at diagnosis: Age Unknown    Child  Still living? Unknown  Family history of retinoblastoma, Age at diagnosis: Age Unknown

## 2023-04-24 NOTE — ED ADULT NURSE NOTE - OBJECTIVE STATEMENT
pt received rm6. A&OX4 respirations even unlabored completing full sentences. pt c/o right arm numbness/weakness since this AM with cp and sob. pt denies active sob at this time. pt endorses slight chest pressure, better than first happened. pt noted to be weaker in right arm compared to left. 2+ pulses present b/l extremities. no facial droop noted, clear speech noted. 02 saturation 100% RA. pt pmhx DM, HTN. pt denies h/a, dizziness/lightheadedness, vision changes, n/v/d, abd pain, gu sx, fevers/chills, sick contacts. 20g placed to R AC, labs drawn and sent, medicated per orders. stretcher lowest position siderails up, safety maintained. pending urine. awaiting XR/CT

## 2023-04-24 NOTE — ED CDU PROVIDER INITIAL DAY NOTE - OBJECTIVE STATEMENT
CDU LADI Judd: 63-year-old female with past medical history of breast cancer status post bilateral mastectomy on no active treatment, hypertension, noninsulin-dependent diabetes, hyperlipidemia, chronic constipation presents to the ER complaining of sternal, nonexertional chest pain for 1 day associated with shortness of breath.  Patient also noting initially having tingling down the right arm to the fingers now having tingling down her left arm to the fingers.  Chest pain has now resolved in the ED labs acutely on actionable.  Troponin <6.  TSH within normal limits.  UA without acute signs of infection, culture sent and pending.  CT angio chest showing no PE, incidentally " medialization of the partial image left true vocal cord, suggestive of vocal cord paralysis.  Patient was seen by ENT direct visualization noted " patient has bilateral vocal cord motion and airway is patent.  No ENT intervention at this time".  CT head performed no acute hemorrhage, mass effect or extra-axial collections.  Chiari I malformation again noted when compared to previous imaging.  Patient placed in CDU for telemetry monitoring, echocardiogram, stress test, telemetry doc of the day evaluation, generalized observation and vital monitoring.  Patient received 162 of aspirin prior to CDU

## 2023-04-24 NOTE — ED CDU PROVIDER INITIAL DAY NOTE - ATTENDING APP SHARED VISIT CONTRIBUTION OF CARE
63-year-old female with past medical history of breast cancer status post bilateral mastectomy on no active treatment, hypertension, noninsulin-dependent diabetes, hyperlipidemia, chronic constipation presents to the ER complaining of sternal, nonexertional chest pain for 1 day associated with shortness of breath.  Patient also noting initially having tingling down the right arm to the fingers now having tingling down her left arm to the fingers.  Chest pain has now resolved in the ED labs acutely on actionable.  Troponin <6.  TSH within normal limits.  UA without acute signs of infection, culture sent and pending.  CT angio chest showing no PE, incidentally " medialization of the partial image left true vocal cord, suggestive of vocal cord paralysis.  Patient was seen by ENT direct visualization noted " patient has bilateral vocal cord motion and airway is patent.  No ENT intervention at this time".  CT head performed no acute hemorrhage, mass effect or extra-axial collections.  Chiari I malformation again noted when compared to previous imaging.    Sent to CDU for tele monitoring, stress test

## 2023-04-24 NOTE — ED ADULT NURSE NOTE - CHIEF COMPLAINT QUOTE
numbness/tingling    pt c/o intermittent tingling right arm since approximately 7am on Sunday... worse this am with sob, cp, nausea, dizziness.  seen by dr dubin.. no stroke code.  pmhx- breast ca with b/l mastectomy.. states can use either arm, htn, diabetes, hyperlipidemia.

## 2023-04-24 NOTE — ED PROVIDER NOTE - PROGRESS NOTE DETAILS
Anny Foreman DO PGY-2  Patient re-assessed. Vitals stable, in no acute distress. Test results explained to patient. Pending CT scan Anny Foreman DO PGY-2  Discussed CT results with patient, patient aware of chiari malformation and discussed vocal cord finding. Consulted ENT, who will see the patient. Plan to keep patient for stress/echo. Discussed with CDU LADI Redd, who will evaluate the patient. Anny Foreman DO PGY-2  ENT scoped patient, no abnormalities visualized. Accepted by CDU.

## 2023-04-24 NOTE — ED CDU PROVIDER INITIAL DAY NOTE - NS ED ATTENDING STATEMENT MOD
This was a shared visit with the WILLIE. I reviewed and verified the documentation and independently performed the documented:

## 2023-04-24 NOTE — ED ADULT TRIAGE NOTE - CHIEF COMPLAINT QUOTE
numbness/tingling    pt c/o intermittent tingling right arm since approximately 7am on Sunday... worse this am with sob, cp, nausea, dizziness.  seen by dr dubin.. no stroke code.  pmhx- breast ca with b/l mastectomy.. states can use either arm, htn, diabetes, hyperlipidemia.
I will START or STAY ON the medications listed below when I get home from the hospital:    acetaminophen 325 mg oral tablet  -- 2 tab(s) by mouth every 6 hours   -- Indication: For pain    ibuprofen 600 mg oral tablet  -- 1 tab(s) by mouth every 6 hours  -- Indication: For pain    ferrous sulfate 325 mg (65 mg elemental iron) oral tablet  -- 1 tab(s) by mouth once a day   -- Check with your doctor before becoming pregnant.  Do not chew, break, or crush.  May discolor urine or feces.    -- Indication: For anemia    Colace 100 mg oral capsule  -- 1 cap(s) by mouth once a day (at bedtime)   -- Medication should be taken with plenty of water.    -- Indication: For constipation

## 2023-04-24 NOTE — PROGRESS NOTE ADULT - SUBJECTIVE AND OBJECTIVE BOX
Patient with a history of BL mastectomy . ED Called for evaluation of patients airway after incidental finding of Vocal cord paralysis on CT scan... Patient c/o intermittent hoarseness. Denies any SOB, dyspnea, On scope exam patient has BL vocal cord motion and airway is patent. No ENT intervention at this time.

## 2023-04-25 VITALS
SYSTOLIC BLOOD PRESSURE: 150 MMHG | DIASTOLIC BLOOD PRESSURE: 68 MMHG | HEART RATE: 66 BPM | OXYGEN SATURATION: 100 % | RESPIRATION RATE: 18 BRPM | TEMPERATURE: 98 F

## 2023-04-25 LAB
CULTURE RESULTS: SIGNIFICANT CHANGE UP
SPECIMEN SOURCE: SIGNIFICANT CHANGE UP

## 2023-04-25 PROCEDURE — 99238 HOSP IP/OBS DSCHRG MGMT 30/<: CPT

## 2023-04-25 RX ADMIN — LOSARTAN POTASSIUM 100 MILLIGRAM(S): 100 TABLET, FILM COATED ORAL at 05:46

## 2023-04-25 RX ADMIN — SPIRONOLACTONE 25 MILLIGRAM(S): 25 TABLET, FILM COATED ORAL at 05:46

## 2023-04-25 NOTE — ED CDU PROVIDER DISPOSITION NOTE - ATTENDING CONTRIBUTION TO CARE
I have personally performed a face to face medical and diagnostic evaluation of the patient. I have discussed with and reviewed the ACP's note and agree with the History, ROS, Physical Exam and MDM unless otherwise indicated. A brief summary of my personal evaluation and impression can be found below.     64 y/o female pmh htn, hld, dm, breast ca s/p b/l mastectomy c/o chest pain and sob. Pt had normal trop and a negative CTA for PE. Pt was seen by ENT for incidental finding of possible vocal cord paralysis and cleared. Pt was sent to the CDU for tele, echo and stress. Pt did well overnight with no acute events. Stress and echo were both negative for acute pathology. pt stable for dc. Given return precautions and follow-up information with teach back. Pricila Arechiga, ED Attending

## 2023-04-25 NOTE — ED CDU PROVIDER DISPOSITION NOTE - PATIENT PORTAL LINK FT
You can access the FollowMyHealth Patient Portal offered by [facility] by registering at the following website: [email or web address]. By joining [employer]’s FollowMyHealth portal, you will also be able to view your health information using other applications (apps) compatible with our system.

## 2023-04-25 NOTE — ED CDU PROVIDER DISPOSITION NOTE - CLINICAL COURSE
62 y/o female pmh htn, hld, dm, breast ca s/p b/l mastectomy c/o chest pain and sob. Pt had normal trop and a negative CTA for PE. Pt was seen by ENT for incidental finding of possible vocal cord paralysis and cleared. Pt was sent to the CDU for tele, echo and stress. Pt did well overnight with no acute events. Stress and echo were both negative for acute pathology. pt stable for dc.

## 2023-04-25 NOTE — ED CDU PROVIDER SUBSEQUENT DAY NOTE - PROGRESS NOTE DETAILS
Pt had normal stress and echo preformed yesterday but did not have a ride home so spent the night in the CDU. pt is adamant about leaving this AM without waiting for an attending eval. Pt states that she has to drive out to [place] and cannot wait and her brother is here to pick her up. Pt advised to wait for attending eval at 9AM but wants to leave. Pt is a&ox3 and capable to make her own medical decisions. pt advised to present back to the ER if symptoms return or worsen. stable for dc.

## 2023-04-25 NOTE — ED CDU PROVIDER SUBSEQUENT DAY NOTE - ATTENDING APP SHARED VISIT CONTRIBUTION OF CARE
I have personally performed a face to face medical and diagnostic evaluation of the patient. I have discussed with and reviewed the ACP's note and agree with the History, ROS, Physical Exam and MDM unless otherwise indicated. A brief summary of my personal evaluation and impression can be found below.     63-year-old female with past medical history of breast cancer status post bilateral mastectomy on no active treatment, hypertension, noninsulin-dependent diabetes, hyperlipidemia, chronic constipation presents to the ER complaining of sternal, nonexertional chest pain for 1 day associated with shortness of breath.  Patient also noting initially having tingling down the right arm to the fingers now having tingling down her left arm to the fingers.  Chest pain has now resolved in the ED labs acutely on actionable.  Troponin <6.  TSH within normal limits.  UA without acute signs of infection, culture sent and pending.  CT angio chest showing no PE, incidentally " medialization of the partial image left true vocal cord, suggestive of vocal cord paralysis.  Patient was seen by ENT direct visualization noted " patient has bilateral vocal cord motion and airway is patent.  No ENT intervention at this time".  CT head performed no acute hemorrhage, mass effect or extra-axial collections.  Chiari I malformation again noted when compared to previous imaging.  Patient placed in CDU for telemetry monitoring, echocardiogram, stress test, telemetry doc of the day evaluation, generalized observation and vital monitoring.  No complaints at this time, still pending final results for both echocardiogram and stress test.  Reassess to dispo. Pricila Arechiga, ED Attending

## 2023-04-25 NOTE — ED CDU PROVIDER SUBSEQUENT DAY NOTE - HISTORY
63-year-old female with past medical history of breast cancer status post bilateral mastectomy on no active treatment, hypertension, noninsulin-dependent diabetes, hyperlipidemia, chronic constipation presents to the ER complaining of sternal, nonexertional chest pain for 1 day associated with shortness of breath.    Stress and echo negative. Observed on tele overnight with no events. Pt likely to be discharged this morning.

## 2023-05-12 NOTE — ED PROVIDER NOTE - INTERPRETATION
"  Occupational Therapy Treatment Note/Updated Plan of Care   Date: 5/11/2023  Name: Claudia Elmore  Clinic Number: [id number]  Age: 4 y.o. 4 m.o.    Therapy Diagnosis:   Encounter Diagnoses   Name Primary?    Developmental delay Yes    Hypotonia      Physician: Kulwinder Barton MD    Physician Orders: Evaluate and Treat   Medical Diagnosis: SMA (Spinal Muscular Atrophy)   Evaluation Date: 12/27/2019  Insurance Authorization Period Expiration: 6/15/2023  Current Plan of Care Certification Period: 10/27/2022 - 4/27/2023  Updated Plan of Care Certification Period: 5/11/2023 - 11/11/2023    Visit # / Visits authorized:  15 / 28  Time In: 4:04  Time Out: 4:49  Total Billable Time: 45 minutes    Precautions:  Standard  Subjective   Father brought Claudia to therapy today.     Pt / caregiver reports: Father reports that he would like for Claudia to improve skills in self care independence (e.g. dressing) and bed mobility.    Response to previous treatment: No new information     Pain: Child reported not feeling well on this date paired with minimum coughing.  Objective   Claudia participated in dynamic functional therapeutic activities to improve functional performance for 45 minutes, including:  - good transition into therapy propelling self in wheelchair  - associative play with preferred kitchen set including above head reach to grab "food" items to facilitate improved active range of motion bilateral shoulder flexion  - engaged in Pe[place] Developmental Motor Scales (2nd edition) to formally reassess fine motor and visual motor skills due to expiring plan of care   - donned socks with minimum assistance for improved self help independence, required maximum assistance to don braces and shoes  - cut across paper utilizing loop scissors with bilateral upper extremities independently for improved visual motor integration skills  - good transition out of therapy session propelling self in wheelchair    Formal Testing: " (completed 1/6/2022, 7/14/2022, 10/27/2022, 5/11/2023)  The PDMS 2nd Edition is a standardized test which consists of six subtests that measures interrelated motor abilities that develop early in life for ages 0-72 months. The grasping subtest measures a child's ability to use his/her hands. It begins with the ability to hold an object with one hand and progresses to actions involving the controlled use of the fingers of both hands. The visual-motor integration (VMI) subtest measures a child's ability to use his/her visual perceptual skills to perform complex eye-hand coordination tasks, such as reaching and grasping for an object, building with blocks, and copying designs. Standard scores are measured with a mean of 10 and standard deviation of 3.      Raw Score Standard Score Percentile Age Equivalent Description   Grasping 47 (+2) 7 16% 43 months Below Average    (+4) 6 9% 40 months Below Average        Home Exercises and Education Provided     Education provided:   - Caregiver educated on current performance and POC. Caregiver verbalized understanding.    Assessment   Claudia was seen for a follow up occupational therapy session with a focus on strengthening, fine motor, and visual motor skills. Claudia's plan of care was updated on this date. Per her performance on the Peabody Developmental Motor Scales (2nd edition), Claudia demonstrated improvements on this assessment and increased scores in both subsections. On the Grasping section, she scored at a percentile of 16% and an age equivalent of 43 months. On the Visual Motor Integration section, she scored at a percentile of 9% and an age equivalent of 40 months.She demonstrated improved ability to perform many visual motor tasks such as cutting across paper with loop scissors. Claudia has made progress towards each of her goals over the past three months. She has demonstrated improved independence with cutting, improved independence with donning socks, and  improved replication of pre-writing strokes such as [place] and cross. She also has demonstrated improved independence with copying letters of her name.   However, she continues to demonstrate limitations which impact her independence with many tasks such as self care skills. Claudia would benefit from continued OT to improve age-appropriate fine motor skills, decreased upper extremity strength, and decreased independence with self-care skills.    Claudia is progressing well towards her goals and there are updates to goals at this time (see below).     Pt will continue to benefit from skilled outpatient occupational therapy to address the deficits listed in the problem list on initial evaluation provide pt/family education and to maximize pt's level of independence in the home and community environment.     Pt prognosis is Good.  Anticipated barriers to occupational therapy: comorbidities   Pt's spiritual, cultural and educational needs considered and pt agreeable to plan of care and goals.    Met/Discontinued Goals:  1. Patient will demonstrate increased visual motor coordination by ability to cut across a paper using loop scissors with minimal cues in 4 out of 5 trials. (MET 5/11)  2. Patient will demonstrate increased visual motor coordination by ability to draw [place] with complete endpoints 4 out of 5 trials with minimal visual cues. (MET 12/1)  3. Patient will demonstrate increased visual motor coordination and independence with education-based tasks by ability to cut across a paper with standard scissors with minimal cues in 4 out of 5 trials. (DISCONTINUED, not appropriate goal)  4. Patient will demonstrate increased age appropriate self help skills by ability to maría elena socks using minimal assist (MET 5/11)      Goals:  Short term goals: (8/12/2023)  1. Patient will demonstrate increased core strength as noted by ability to perform crunch while on wedge into sitting with no more than minimum assistance for  increased bed mobility. (New goal)  2. Patient will demonstrate improved self-help independence by ability to don shirt with minimum assistance 2/3 times assessed. (Progressing, requires moderate assistance)  3. Patient will demonstrate improved fine motor control by ability to to maintain mature grasp of writing utensil after set up for 70% of writing activities. (Progressing, maintains mature grasp ~60% of the time)  4. Patient will demonstrate improved self help skills as noted by ability to don socks and shoes with minimum assistance for improved independence with lower body dressing. (New Goal)     Long term goals: (11/12/2023)  1. Patient will demonstrate increased upper extremity strength/endurance by ability to push from prone on wedge to prone on extended upper extremities with minimum assistance for increased bed mobility (New goal)  2. Patient will demonstrate increased UE strength/endurance by ability to maintain prone on extended UEs x 30 seconds on wedge with contact guard assistance for 2 consecutive sessions. (Progressing, requires minimum-moderate assistance to maintain position  3. Patient will demonstrate improved self-help independence by ability to don shirt with minimum verbal cueing 2/3 times assessed. (Progressing, requires moderate assistance)  4. Patient will demonstrate improved fine motor control by ability to to maintain mature grasp of writing utensil after set up for 85% of writing activities. (New Goal)       Plan     Updated Certification Period: 5/11/2023 to 11/11/2023  Recommended Treatment Plan: 1 times per week for 6 months: Patient Education, Self Care, Therapeutic Activities, and Therapeutic Exercise    Christina Candelario OT  5/11/2023      I CERTIFY THE NEED FOR THESE SERVICES FURNISHED UNDER THIS PLAN OF TREATMENT AND WHILE UNDER MY CARE    Physician's comments:        Physician's Signature: ___________________________________________________  normal

## 2023-05-22 ENCOUNTER — APPOINTMENT (OUTPATIENT)
Dept: SURGICAL ONCOLOGY | Facility: CLINIC | Age: 63
End: 2023-05-22

## 2023-11-07 DIAGNOSIS — Z08 ENCOUNTER FOR FOLLOW-UP EXAMINATION AFTER COMPLETED TREATMENT FOR MALIGNANT NEOPLASM: ICD-10-CM

## 2023-11-07 DIAGNOSIS — Z85.3 ENCOUNTER FOR FOLLOW-UP EXAMINATION AFTER COMPLETED TREATMENT FOR MALIGNANT NEOPLASM: ICD-10-CM

## 2023-11-09 ENCOUNTER — APPOINTMENT (OUTPATIENT)
Dept: SURGICAL ONCOLOGY | Facility: CLINIC | Age: 63
End: 2023-11-09

## 2023-12-08 ENCOUNTER — APPOINTMENT (OUTPATIENT)
Dept: MRI IMAGING | Facility: IMAGING CENTER | Age: 63
End: 2023-12-08
Payer: MEDICARE

## 2023-12-08 ENCOUNTER — OUTPATIENT (OUTPATIENT)
Dept: OUTPATIENT SERVICES | Facility: HOSPITAL | Age: 63
LOS: 1 days | End: 2023-12-08
Payer: COMMERCIAL

## 2023-12-08 DIAGNOSIS — Z08 ENCOUNTER FOR FOLLOW-UP EXAMINATION AFTER COMPLETED TREATMENT FOR MALIGNANT NEOPLASM: ICD-10-CM

## 2023-12-08 DIAGNOSIS — Z90.13 ACQUIRED ABSENCE OF BILATERAL BREASTS AND NIPPLES: Chronic | ICD-10-CM

## 2023-12-08 DIAGNOSIS — Z98.890 OTHER SPECIFIED POSTPROCEDURAL STATES: Chronic | ICD-10-CM

## 2023-12-08 PROCEDURE — C8908: CPT

## 2023-12-08 PROCEDURE — A9585: CPT

## 2023-12-08 PROCEDURE — C8937: CPT

## 2023-12-08 PROCEDURE — 77049 MRI BREAST C-+ W/CAD BI: CPT | Mod: 26

## 2024-02-19 PROBLEM — Z85.3 HISTORY OF BREAST CANCER: Status: ACTIVE | Noted: 2018-03-13

## 2024-02-19 PROBLEM — D05.11 DUCTAL CARCINOMA IN SITU (DCIS) OF RIGHT BREAST: Status: ACTIVE | Noted: 2017-07-20

## 2024-02-26 ENCOUNTER — APPOINTMENT (OUTPATIENT)
Dept: SURGICAL ONCOLOGY | Facility: CLINIC | Age: 64
End: 2024-02-26
Payer: MEDICARE

## 2024-02-26 VITALS
HEIGHT: 68 IN | WEIGHT: 211 LBS | DIASTOLIC BLOOD PRESSURE: 80 MMHG | HEART RATE: 78 BPM | BODY MASS INDEX: 31.98 KG/M2 | OXYGEN SATURATION: 98 % | SYSTOLIC BLOOD PRESSURE: 120 MMHG

## 2024-02-26 DIAGNOSIS — D05.11 INTRADUCTAL CARCINOMA IN SITU OF RIGHT BREAST: ICD-10-CM

## 2024-02-26 DIAGNOSIS — Z85.3 PERSONAL HISTORY OF MALIGNANT NEOPLASM OF BREAST: ICD-10-CM

## 2024-02-26 PROCEDURE — 99214 OFFICE O/P EST MOD 30 MIN: CPT

## 2024-02-26 NOTE — ASSESSMENT
[FreeTextEntry1] :  Niurka is doing well.  She will undergo repeat imaging and follow-up next year.  All medical entries were at my, Dr. Elbert Savage, direction.  I have reviewed the chart and agree that the record accurately reflects my personal performance of the history, physical exam, assessment and plan.  Our office nurse practitioner was present for the duration of the office visit.

## 2024-02-26 NOTE — CONSULT LETTER
[Dear  ___] : Dear  [unfilled], [Consult Letter:] : I had the pleasure of evaluating your patient, [unfilled]. [Please see my note below.] : Please see my note below. [Consult Closing:] : Thank you very much for allowing me to participate in the care of this patient.  If you have any questions, please do not hesitate to contact me. [Sincerely,] : Sincerely, [DrSvetlana  ___] : Dr. GIORDANO [DrSvetlana ___] : Dr. GIORDANO [FreeTextEntry2] : Melanie Pascual MD  [FreeTextEntry3] : Elbert Savage MD\par  Surgical Oncology\par  [facility]\par  Office: [phone]\par  Cell: [phone]

## 2024-02-26 NOTE — HISTORY OF PRESENT ILLNESS
[de-identified] : Niurka is a pleasant 64 year-old female who returns for follow up.    She was initially seen for consultation on 7/20/17.  In April 2017 she completed her annual screening bilateral mammogram and sonogram.  Mammographically, there is a large amount of microcalcifications present in the lower hemisphere of the right breast which are indeterminate (BIRADS 4).  There are probable benign left breast nodules as well for which a 6 month follow up left-sided mammogram is recommended.  She underwent a stereotactic biopsy of the right breast calcifications and final pathology demonstrated multiple foci of DCIS, intermediate to high nuclear grade, with focal central necrosis and calcifications, ER+/NJ+/HER2(-).  At the time of her initial presentation, she denied palpable breast mass but noted recent onset of non-spontaneous bloody nipple discharge from the right breast.   She is status post bilateral mastectomies with tissue expanders on 8/15/17.  Final pathology of the right breast was consistent with DCIS (ER+/NJ+) with 10 negative right axillary lymph nodes, 8 of which were sentinel nodes.  Final pathology of the left breast and 2 left axillary sentinel lymph nodes were benign. She is s/p revision bilateral breast reconstruction and placement of implants with Dr. Ramachandran on 3/5/18.    She was seen in the office on 11/5/18 at which point she continued to experience bilateral chest wall discomfort with referred pain to Right shoulder.  She stated that pain is becoming unbearable and she was considering having the implants removed.   She completed PT and has been using a lymphedema machine.  Since that time she continues to be followed by Dr. Espinosa from [facility] and had an orthopedic evaluation.  MRI of her spine indicated a chiari malformation as well as lumbar stenosis.  She was evaluated by Dr. Edgardo Panda who felt she may benefit from a suboccipital craniectomy, C1 laminectomy, and possible duraplasty but she declined surgery.  I had asked her to complete a breast MRI in November 2018, however, she became ill prior to contrast administration and the exam was terminated.  There was incidental note of skin thickening and subcutaneous stranding in the lower inner right breast.  She denies any palpable subcutaneous masses involving the reconstructed breasts or chest wall bilaterally although she continues to experience intermittent pain involving the chest wall and breasts (right>left).  She will eventually pursue further revision of her reconstruction with Dr. Ramachandran.   She has not done so because her son recently passed away in a motor vehicle accident.  She also reports intermittent swelling on the right side of her neck for the past year.   Her PMH is otherwise notable for HTN, DM II and mild intermittent asthma.  She has no prior issues related to her breasts. She has no family history of breast or ovarian cancer but her father had gastric cancer and her son was diagnosed with retinoblastoma  4/12/2021: Today, she presents due to recent pain and swelling in her right axilla and upper arm. She states "she feels as though something is going on with her implants" but has not followed up with Dr. Ramachandran regarding this. She states she has fullness and pain in her neck as well.   5/10/2022: ultrasound performed with negative findings (BI-RADS 1)  5/16/22: Pt with history of b/l mastectomies with implant, patient continue to complaint of b/l chest tightness and axillary tightness.   3/27/23: Niurka returns for a one year follow up exam.  Still reports some tightness in her right axilla and neck but doing well overall.  Denies any palpable masses involving the reconstructed breasts or chest wall.  Denies any constitutional symptoms.   Breast MRI 12/2023- benign findings (BIRADS 1).   2/26/2024: Niurka returns for annual follow up.  Denies any palpable masses involving the chest wall/reconstructed breasts.  Continues with itermittent tightness right axilla, prefers to manage by doing exercises and declines PT at this time.

## 2024-02-26 NOTE — PHYSICAL EXAM
[Normal] : supple, no neck mass and thyroid not enlarged [Normal Neck Lymph Nodes] : normal neck lymph nodes  [Normal Supraclavicular Lymph Nodes] : normal supraclavicular lymph nodes [Normal Groin Lymph Nodes] : normal groin lymph nodes [Normal Axillary Lymph Nodes] : normal axillary lymph nodes [Normal] : oriented to person, place and time, with appropriate affect [FreeTextEntry1] : AB  present during exam\par   [de-identified] : There are no subcutaneous masses on either side.  Implant reconstruction of the present of both sides.  There is no axillary adenopathy on either side.

## 2025-05-08 DIAGNOSIS — D05.11 INTRADUCTAL CARCINOMA IN SITU OF RIGHT BREAST: ICD-10-CM

## 2025-05-08 DIAGNOSIS — Z85.3 PERSONAL HISTORY OF MALIGNANT NEOPLASM OF BREAST: ICD-10-CM

## 2025-05-16 ENCOUNTER — APPOINTMENT (OUTPATIENT)
Dept: MRI IMAGING | Facility: IMAGING CENTER | Age: 65
End: 2025-05-16
Payer: MEDICARE

## 2025-05-16 ENCOUNTER — OUTPATIENT (OUTPATIENT)
Dept: OUTPATIENT SERVICES | Facility: HOSPITAL | Age: 65
LOS: 1 days | End: 2025-05-16
Payer: COMMERCIAL

## 2025-05-16 DIAGNOSIS — Z90.13 ACQUIRED ABSENCE OF BILATERAL BREASTS AND NIPPLES: Chronic | ICD-10-CM

## 2025-05-16 DIAGNOSIS — Z00.8 ENCOUNTER FOR OTHER GENERAL EXAMINATION: ICD-10-CM

## 2025-05-16 DIAGNOSIS — D05.11 INTRADUCTAL CARCINOMA IN SITU OF RIGHT BREAST: ICD-10-CM

## 2025-05-16 DIAGNOSIS — Z98.890 OTHER SPECIFIED POSTPROCEDURAL STATES: Chronic | ICD-10-CM

## 2025-05-16 PROCEDURE — A9585: CPT

## 2025-05-16 PROCEDURE — C8908: CPT

## 2025-05-16 PROCEDURE — C8937: CPT

## 2025-05-16 PROCEDURE — 77049 MRI BREAST C-+ W/CAD BI: CPT | Mod: 26

## 2025-05-29 ENCOUNTER — APPOINTMENT (OUTPATIENT)
Dept: PLASTIC SURGERY | Facility: CLINIC | Age: 65
End: 2025-05-29
Payer: MEDICARE

## 2025-05-29 VITALS
DIASTOLIC BLOOD PRESSURE: 88 MMHG | OXYGEN SATURATION: 99 % | TEMPERATURE: 97.9 F | WEIGHT: 211 LBS | HEART RATE: 78 BPM | BODY MASS INDEX: 31.25 KG/M2 | SYSTOLIC BLOOD PRESSURE: 157 MMHG | HEIGHT: 69 IN

## 2025-05-29 DIAGNOSIS — Z85.3 PERSONAL HISTORY OF MALIGNANT NEOPLASM OF BREAST: ICD-10-CM

## 2025-05-29 DIAGNOSIS — N65.0 DEFORMITY OF RECONSTRUCTED BREAST: ICD-10-CM

## 2025-05-29 PROCEDURE — 99203 OFFICE O/P NEW LOW 30 MIN: CPT

## 2025-05-30 PROBLEM — N65.0 DEFORMITY OF RECONSTRUCTED BREAST: Status: ACTIVE | Noted: 2025-05-30

## 2025-06-05 ENCOUNTER — APPOINTMENT (OUTPATIENT)
Dept: SURGICAL ONCOLOGY | Facility: CLINIC | Age: 65
End: 2025-06-05
Payer: MEDICARE

## 2025-06-05 VITALS
HEART RATE: 80 BPM | SYSTOLIC BLOOD PRESSURE: 148 MMHG | BODY MASS INDEX: 30.81 KG/M2 | HEIGHT: 69 IN | WEIGHT: 208 LBS | DIASTOLIC BLOOD PRESSURE: 82 MMHG | OXYGEN SATURATION: 98 %

## 2025-06-05 DIAGNOSIS — D05.11 INTRADUCTAL CARCINOMA IN SITU OF RIGHT BREAST: ICD-10-CM

## 2025-06-05 PROCEDURE — 99214 OFFICE O/P EST MOD 30 MIN: CPT

## 2025-07-14 ENCOUNTER — OUTPATIENT (OUTPATIENT)
Dept: OUTPATIENT SERVICES | Facility: HOSPITAL | Age: 65
LOS: 1 days | End: 2025-07-14

## 2025-07-14 VITALS
RESPIRATION RATE: 14 BRPM | TEMPERATURE: 98 F | HEIGHT: 68 IN | SYSTOLIC BLOOD PRESSURE: 144 MMHG | DIASTOLIC BLOOD PRESSURE: 85 MMHG | HEART RATE: 64 BPM | OXYGEN SATURATION: 98 % | WEIGHT: 203.93 LBS

## 2025-07-14 DIAGNOSIS — Z92.89 PERSONAL HISTORY OF OTHER MEDICAL TREATMENT: Chronic | ICD-10-CM

## 2025-07-14 DIAGNOSIS — Z98.82 BREAST IMPLANT STATUS: Chronic | ICD-10-CM

## 2025-07-14 DIAGNOSIS — Z85.3 PERSONAL HISTORY OF MALIGNANT NEOPLASM OF BREAST: ICD-10-CM

## 2025-07-14 DIAGNOSIS — N65.0 DEFORMITY OF RECONSTRUCTED BREAST: ICD-10-CM

## 2025-07-14 DIAGNOSIS — Z98.890 OTHER SPECIFIED POSTPROCEDURAL STATES: Chronic | ICD-10-CM

## 2025-07-14 DIAGNOSIS — Z90.13 ACQUIRED ABSENCE OF BILATERAL BREASTS AND NIPPLES: Chronic | ICD-10-CM

## 2025-07-14 NOTE — H&P PST ADULT - GASTROINTESTINAL
details… soft/nontender/nondistended/normal active bowel sounds/no guarding/no rigidity/no organomegaly/no palpable katja/no masses palpable

## 2025-07-14 NOTE — H&P PST ADULT - NSICDXPASTMEDICALHX_GEN_ALL_CORE_FT
PAST MEDICAL HISTORY:  Asthma stable    HLD (hyperlipidemia) no meds    HTN (hypertension) controlled    Obesity     BHARATHI (obstructive sleep apnea)     Type 2 diabetes mellitus      PAST MEDICAL HISTORY:  Asthma     Borderline diabetes     Chiari I malformation     Deformity of reconstructed breast     Hyperlipidemia     Hypertension     Malignant neoplasm of breast     Obesity     BHARATHI (obstructive sleep apnea)

## 2025-07-14 NOTE — H&P PST ADULT - RESPIRATORY
clear to auscultation bilaterally/no rales/no respiratory distress/no use of accessory muscles/no subcutaneous emphysema/airway patent/breath sounds equal/good air movement/respirations non-labored/no intercostal retractions

## 2025-07-14 NOTE — H&P PST ADULT - FUNCTIONAL STATUS
DASI 9.89 stationary bike, lift weights, walks 4 miles 3 times a week, able to climb 4 flights of stairs

## 2025-07-14 NOTE — H&P PST ADULT - NEUROLOGICAL COMMENTS
cervical syrinx and the Chiari one malformation was evaluated by neurology Dr. Paulino Porter  in 2020 as per not in allscripts -  MRI - stable.  Pt reports continues to have intermittent numbness in  tingling in  bilateral hands mostly right

## 2025-07-14 NOTE — H&P PST ADULT - ENDOCRINE COMMENTS
Ciarra Bernard and he said he agrees to have Dr. Gonzalez increase his zyprexa to help him sleep.    hx of borderline dm was on metformin stopped 2 yrs ago

## 2025-07-14 NOTE — H&P PST ADULT - PROBLEM SELECTOR PLAN 1
Pt scheduled for revision of reconstructed breasts, bilateral implant exchange, fat grafting, possible liposuction of lateral chest on 7/21/2025.  Chlorhexidene provided -  written and verbal instructions given, with teach back, pt able to verbalize understanding.  Preop teaching done, pt able to verbalize understanding.   medications day of procedure-  telmisartan, amlodipine   anesthesia -  sleep apnea

## 2025-07-14 NOTE — H&P PST ADULT - REASON FOR ADMISSION
revision of reconstructed breasts, bilateral implant exchange, fat grafting, possible liposuction of lateral chest

## 2025-07-14 NOTE — H&P PST ADULT - HISTORY OF PRESENT ILLNESS
64y/o female scheduled for revision of reconstructed breasts, bilateral implant exchange, fat grafting, possible liposuction of lateral chest on 7/21/2025.  Pt with hx right breast cancer, DCIS 2017, s/p bilateral mastectomy, with breast implants 2017.  For the past year felt something in right breast, has yearly MRI shows ruptured breast implants    64y/o female scheduled for revision of reconstructed breasts, bilateral implant exchange, fat grafting, possible liposuction of lateral chest on 7/21/2025.  Pt with hx right breast cancer, DCIS 2017, s/p bilateral mastectomy, with breast implants 2017.  For the past year felt something in right breast, has yearly MRI shows ruptured breast implants.

## 2025-07-14 NOTE — H&P PST ADULT - NSICDXPASTSURGICALHX_GEN_ALL_CORE_FT
PAST SURGICAL HISTORY:  H/O myomectomy     S/P bilateral breast implants     S/P mastectomy, bilateral with axillary node dissection with expanders     PAST SURGICAL HISTORY:  H/O myomectomy     History of endometrial biopsy     S/P bilateral breast implants     S/P mastectomy, bilateral with axillary node dissection with expanders

## 2025-07-14 NOTE — H&P PST ADULT - SKIN/BREAST COMMENTS
right breast malignancy DCIS s/p bilateral mastectomy, breast implants, right breast implant ruptured  c/o discomfort

## 2025-07-15 PROBLEM — E11.9 TYPE 2 DIABETES MELLITUS WITHOUT COMPLICATIONS: Chronic | Status: INACTIVE | Noted: 2017-05-18 | Resolved: 2025-07-14

## 2025-07-15 PROBLEM — I10 ESSENTIAL (PRIMARY) HYPERTENSION: Chronic | Status: INACTIVE | Noted: 2017-03-20 | Resolved: 2025-07-14

## 2025-07-15 PROBLEM — E78.5 HYPERLIPIDEMIA, UNSPECIFIED: Chronic | Status: INACTIVE | Noted: 2017-05-18 | Resolved: 2025-07-14

## 2025-07-15 PROBLEM — J45.909 UNSPECIFIED ASTHMA, UNCOMPLICATED: Chronic | Status: INACTIVE | Noted: 2017-03-20 | Resolved: 2025-07-14

## 2025-07-21 ENCOUNTER — RESULT REVIEW (OUTPATIENT)
Age: 65
End: 2025-07-21

## 2025-07-21 ENCOUNTER — APPOINTMENT (OUTPATIENT)
Dept: PLASTIC SURGERY | Facility: AMBULATORY SURGERY CENTER | Age: 65
End: 2025-07-21

## 2025-07-21 ENCOUNTER — OUTPATIENT (OUTPATIENT)
Dept: OUTPATIENT SERVICES | Facility: HOSPITAL | Age: 65
LOS: 1 days | End: 2025-07-21
Payer: MEDICARE

## 2025-07-21 ENCOUNTER — TRANSCRIPTION ENCOUNTER (OUTPATIENT)
Age: 65
End: 2025-07-21

## 2025-07-21 VITALS
WEIGHT: 203.93 LBS | TEMPERATURE: 98 F | RESPIRATION RATE: 18 BRPM | SYSTOLIC BLOOD PRESSURE: 143 MMHG | HEART RATE: 70 BPM | OXYGEN SATURATION: 97 % | DIASTOLIC BLOOD PRESSURE: 84 MMHG | HEIGHT: 68 IN

## 2025-07-21 VITALS
SYSTOLIC BLOOD PRESSURE: 146 MMHG | TEMPERATURE: 97 F | RESPIRATION RATE: 18 BRPM | OXYGEN SATURATION: 98 % | DIASTOLIC BLOOD PRESSURE: 74 MMHG | HEART RATE: 76 BPM

## 2025-07-21 DIAGNOSIS — Z98.82 BREAST IMPLANT STATUS: Chronic | ICD-10-CM

## 2025-07-21 DIAGNOSIS — Z98.890 OTHER SPECIFIED POSTPROCEDURAL STATES: Chronic | ICD-10-CM

## 2025-07-21 DIAGNOSIS — Z90.13 ACQUIRED ABSENCE OF BILATERAL BREASTS AND NIPPLES: Chronic | ICD-10-CM

## 2025-07-21 DIAGNOSIS — Z92.89 PERSONAL HISTORY OF OTHER MEDICAL TREATMENT: Chronic | ICD-10-CM

## 2025-07-21 DIAGNOSIS — Z85.3 PERSONAL HISTORY OF MALIGNANT NEOPLASM OF BREAST: ICD-10-CM

## 2025-07-21 PROCEDURE — 15772 GRFG AUTOL FAT LIPO EA ADDL: CPT

## 2025-07-21 PROCEDURE — 15771 GRFG AUTOL FAT LIPO 50 CC/<: CPT

## 2025-07-21 PROCEDURE — 19342 INSJ/RPLCMT BRST IMPLT SEP D: CPT | Mod: 50

## 2025-07-21 PROCEDURE — 19380 REVJ RECONSTRUCTED BREAST: CPT | Mod: 50,59

## 2025-07-21 PROCEDURE — 88300 SURGICAL PATH GROSS: CPT | Mod: 26

## 2025-07-21 DEVICE — IMPLANTABLE DEVICE: Type: IMPLANTABLE DEVICE | Status: FUNCTIONAL

## 2025-07-21 RX ORDER — ROSUVASTATIN CALCIUM 20 MG/1
1 TABLET, FILM COATED ORAL
Refills: 0 | DISCHARGE

## 2025-07-21 RX ORDER — AMLODIPINE BESYLATE 10 MG/1
1 TABLET ORAL
Refills: 0 | DISCHARGE

## 2025-07-21 RX ORDER — TELMISARTAN 20 MG/1
1 TABLET ORAL
Refills: 0 | DISCHARGE

## 2025-07-21 RX ORDER — OXYCODONE HYDROCHLORIDE 30 MG/1
1 TABLET ORAL
Qty: 12 | Refills: 0
Start: 2025-07-21 | End: 2025-07-23

## 2025-07-21 RX ORDER — CEFADROXIL 500 MG/1
1 CAPSULE ORAL
Qty: 14 | Refills: 0
Start: 2025-07-21 | End: 2025-07-27

## 2025-07-25 LAB — SURGICAL PATHOLOGY STUDY: SIGNIFICANT CHANGE UP

## 2025-07-31 ENCOUNTER — APPOINTMENT (OUTPATIENT)
Dept: PLASTIC SURGERY | Facility: CLINIC | Age: 65
End: 2025-07-31
Payer: MEDICARE

## 2025-07-31 PROBLEM — J45.909 UNSPECIFIED ASTHMA, UNCOMPLICATED: Chronic | Status: ACTIVE | Noted: 2025-07-14

## 2025-07-31 PROBLEM — R73.03 PREDIABETES: Chronic | Status: ACTIVE | Noted: 2025-07-14

## 2025-07-31 PROBLEM — E78.5 HYPERLIPIDEMIA, UNSPECIFIED: Chronic | Status: ACTIVE | Noted: 2025-07-14

## 2025-07-31 PROBLEM — N65.0 DEFORMITY OF RECONSTRUCTED BREAST: Chronic | Status: ACTIVE | Noted: 2025-07-14

## 2025-07-31 PROBLEM — C50.919 MALIGNANT NEOPLASM OF UNSPECIFIED SITE OF UNSPECIFIED FEMALE BREAST: Chronic | Status: ACTIVE | Noted: 2025-07-14

## 2025-07-31 PROBLEM — I10 ESSENTIAL (PRIMARY) HYPERTENSION: Chronic | Status: ACTIVE | Noted: 2025-07-14

## 2025-07-31 PROBLEM — G93.5 COMPRESSION OF BRAIN: Chronic | Status: ACTIVE | Noted: 2025-07-14

## 2025-07-31 PROCEDURE — 99024 POSTOP FOLLOW-UP VISIT: CPT

## 2025-08-14 ENCOUNTER — APPOINTMENT (OUTPATIENT)
Dept: PLASTIC SURGERY | Facility: CLINIC | Age: 65
End: 2025-08-14
Payer: MEDICARE

## 2025-08-14 VITALS
OXYGEN SATURATION: 99 % | DIASTOLIC BLOOD PRESSURE: 84 MMHG | WEIGHT: 207.19 LBS | TEMPERATURE: 97.8 F | SYSTOLIC BLOOD PRESSURE: 145 MMHG | HEART RATE: 70 BPM | BODY MASS INDEX: 30.69 KG/M2 | HEIGHT: 69 IN

## 2025-08-14 DIAGNOSIS — Z85.3 PERSONAL HISTORY OF MALIGNANT NEOPLASM OF BREAST: ICD-10-CM

## 2025-08-14 DIAGNOSIS — Z98.890 OTHER SPECIFIED POSTPROCEDURAL STATES: ICD-10-CM

## 2025-08-14 PROCEDURE — 99024 POSTOP FOLLOW-UP VISIT: CPT

## (undated) DEVICE — ELCTR BUTTON SWITCH W EDGE COATED BLADE 3MM

## (undated) DEVICE — MARKING PEN W RULER / LABELS

## (undated) DEVICE — DRAPE INSTRUMENT POUCH 6.75" X 11"

## (undated) DEVICE — ONETRAC LIGHTED RETRACTOR 135 X 30MM DISP

## (undated) DEVICE — BLADE SURGICAL #15 CARBON

## (undated) DEVICE — ELCTR GROUNDING PAD ADULT COVIDIEN

## (undated) DEVICE — LAP PAD W RING 18 X 18"

## (undated) DEVICE — STOPCOCK 3 WAY

## (undated) DEVICE — DRAPE 3/4 SHEET 52X76"

## (undated) DEVICE — SYR LUER LOK 50CC

## (undated) DEVICE — SUT MONOCRYL 3-0 18" PS-2 UNDYED

## (undated) DEVICE — DRSG STERISTRIPS 0.5 X 4"

## (undated) DEVICE — Device

## (undated) DEVICE — SOL IRR POUR NS 0.9% 500ML

## (undated) DEVICE — POSITIONER PATIENT SAFETY STRAP 3X60"

## (undated) DEVICE — SOL INJ LR 1000ML

## (undated) DEVICE — WARMING BLANKET LOWER ADULT

## (undated) DEVICE — STAPLER SKIN VISI-STAT 35 WIDE

## (undated) DEVICE — TUBING INFILTRATION

## (undated) DEVICE — SUT MONOCRYL 5-0 18" P-3 UNDYED

## (undated) DEVICE — ELCTR ROCKER SWITCH PENCIL BLUE 10FT

## (undated) DEVICE — TUBING LIPOSUCTION HI-VAC PSI-TEC

## (undated) DEVICE — TUBING SUCTION NON CONDUCTIVE 316X18" STERILE

## (undated) DEVICE — FUNNEL STRL 6 IND PK

## (undated) DEVICE — SYR LUER LOK 10CC

## (undated) DEVICE — DRSG COMBINE 5X9"

## (undated) DEVICE — DRAPE TOWEL BLUE 17" X 24"

## (undated) DEVICE — POSITIONER FOAM EGG CRATE ULNAR 2PCS (PINK)

## (undated) DEVICE — DRSG CURITY GAUZE SPONGE 4 X 4" 12-PLY

## (undated) DEVICE — DRSG KERLIX MED 6"

## (undated) DEVICE — PACK MINOR NO DRAPE

## (undated) DEVICE — SPECIMEN TRAP 70ML

## (undated) DEVICE — ELCTR STRYKER NEPTUNE SMOKE EVACUATION PENCIL (GREEN)

## (undated) DEVICE — SUT ETHILON 5-0 18" PS-2

## (undated) DEVICE — VENODYNE/SCD SLEEVE CALF MEDIUM

## (undated) DEVICE — WARMING BLANKET FULL UNDERBODY

## (undated) DEVICE — SUT VICRYL 2-0 27" SH UNDYED